# Patient Record
Sex: FEMALE | Race: WHITE | NOT HISPANIC OR LATINO | Employment: OTHER | URBAN - METROPOLITAN AREA
[De-identification: names, ages, dates, MRNs, and addresses within clinical notes are randomized per-mention and may not be internally consistent; named-entity substitution may affect disease eponyms.]

---

## 2018-03-05 ENCOUNTER — RECORDS - HEALTHEAST (OUTPATIENT)
Dept: LAB | Facility: CLINIC | Age: 78
End: 2018-03-05

## 2018-03-05 LAB
ANION GAP SERPL CALCULATED.3IONS-SCNC: 11 MMOL/L (ref 5–18)
BUN SERPL-MCNC: 12 MG/DL (ref 8–28)
CALCIUM SERPL-MCNC: 9.6 MG/DL (ref 8.5–10.5)
CHLORIDE BLD-SCNC: 107 MMOL/L (ref 98–107)
CHOLEST SERPL-MCNC: 268 MG/DL
CO2 SERPL-SCNC: 24 MMOL/L (ref 22–31)
CREAT SERPL-MCNC: 0.69 MG/DL (ref 0.6–1.1)
FASTING STATUS PATIENT QL REPORTED: YES
GFR SERPL CREATININE-BSD FRML MDRD: >60 ML/MIN/1.73M2
GLUCOSE BLD-MCNC: 101 MG/DL (ref 70–125)
HDLC SERPL-MCNC: 44 MG/DL
LDLC SERPL CALC-MCNC: 136 MG/DL
LDLC SERPL CALC-MCNC: ABNORMAL MG/DL
POTASSIUM BLD-SCNC: 4.4 MMOL/L (ref 3.5–5)
SODIUM SERPL-SCNC: 142 MMOL/L (ref 136–145)
TRIGL SERPL-MCNC: 440 MG/DL

## 2019-06-18 ENCOUNTER — RECORDS - HEALTHEAST (OUTPATIENT)
Dept: LAB | Facility: CLINIC | Age: 79
End: 2019-06-18

## 2019-06-18 LAB
ALBUMIN SERPL-MCNC: 4.2 G/DL (ref 3.5–5)
ALP SERPL-CCNC: 66 U/L (ref 45–120)
ALT SERPL W P-5'-P-CCNC: 23 U/L (ref 0–45)
ANION GAP SERPL CALCULATED.3IONS-SCNC: 9 MMOL/L (ref 5–18)
AST SERPL W P-5'-P-CCNC: 23 U/L (ref 0–40)
BILIRUB SERPL-MCNC: 0.6 MG/DL (ref 0–1)
BUN SERPL-MCNC: 13 MG/DL (ref 8–28)
CALCIUM SERPL-MCNC: 10 MG/DL (ref 8.5–10.5)
CHLORIDE BLD-SCNC: 107 MMOL/L (ref 98–107)
CHOLEST SERPL-MCNC: 203 MG/DL
CO2 SERPL-SCNC: 26 MMOL/L (ref 22–31)
CREAT SERPL-MCNC: 0.72 MG/DL (ref 0.6–1.1)
FASTING STATUS PATIENT QL REPORTED: ABNORMAL
GFR SERPL CREATININE-BSD FRML MDRD: >60 ML/MIN/1.73M2
GLUCOSE BLD-MCNC: 98 MG/DL (ref 70–125)
HDLC SERPL-MCNC: 47 MG/DL
LDLC SERPL CALC-MCNC: 94 MG/DL
POTASSIUM BLD-SCNC: 4.6 MMOL/L (ref 3.5–5)
PROT SERPL-MCNC: 6.9 G/DL (ref 6–8)
SODIUM SERPL-SCNC: 142 MMOL/L (ref 136–145)
TRIGL SERPL-MCNC: 309 MG/DL

## 2021-05-29 ENCOUNTER — RECORDS - HEALTHEAST (OUTPATIENT)
Dept: ADMINISTRATIVE | Facility: CLINIC | Age: 81
End: 2021-05-29

## 2021-07-26 ENCOUNTER — HOSPITAL ENCOUNTER (INPATIENT)
Facility: HOSPITAL | Age: 81
LOS: 3 days | Discharge: HOME | DRG: 409 | End: 2021-07-30
Attending: EMERGENCY MEDICINE | Admitting: FAMILY MEDICINE
Payer: COMMERCIAL

## 2021-07-26 DIAGNOSIS — R74.01 TRANSAMINITIS: ICD-10-CM

## 2021-07-26 DIAGNOSIS — B96.20 E COLI BACTEREMIA: Primary | ICD-10-CM

## 2021-07-26 DIAGNOSIS — Z90.49 S/P LAPAROSCOPIC CHOLECYSTECTOMY: ICD-10-CM

## 2021-07-26 DIAGNOSIS — R78.81 E COLI BACTEREMIA: Primary | ICD-10-CM

## 2021-07-26 DIAGNOSIS — K80.50 CHOLEDOCHOLITHIASIS: ICD-10-CM

## 2021-07-26 LAB
ALBUMIN SERPL-MCNC: 3.9 G/DL (ref 3.5–5)
ALP SERPL-CCNC: 163 U/L (ref 45–120)
ALT SERPL W P-5'-P-CCNC: 641 U/L (ref 0–45)
ANION GAP SERPL CALCULATED.3IONS-SCNC: 14 MMOL/L (ref 5–18)
AST SERPL W P-5'-P-CCNC: 979 U/L (ref 0–40)
BILIRUB DIRECT SERPL-MCNC: 1.6 MG/DL
BILIRUB SERPL-MCNC: 3.1 MG/DL (ref 0–1)
BUN SERPL-MCNC: 13 MG/DL (ref 8–28)
CALCIUM SERPL-MCNC: 9.4 MG/DL (ref 8.5–10.5)
CHLORIDE BLD-SCNC: 102 MMOL/L (ref 98–107)
CO2 SERPL-SCNC: 22 MMOL/L (ref 22–31)
CREAT SERPL-MCNC: 0.77 MG/DL (ref 0.6–1.1)
ERYTHROCYTE [DISTWIDTH] IN BLOOD BY AUTOMATED COUNT: 12.1 % (ref 10–15)
GFR SERPL CREATININE-BSD FRML MDRD: 73 ML/MIN/1.73M2
GLUCOSE BLD-MCNC: 132 MG/DL (ref 70–125)
HCT VFR BLD AUTO: 40.5 % (ref 35–47)
HGB BLD-MCNC: 13.7 G/DL (ref 11.7–15.7)
HOLD SPECIMEN: NORMAL
LACTATE SERPL-SCNC: 2.8 MMOL/L (ref 0.7–2)
LIPASE SERPL-CCNC: 65 U/L (ref 0–52)
MCH RBC QN AUTO: 32.7 PG (ref 26.5–33)
MCHC RBC AUTO-ENTMCNC: 33.8 G/DL (ref 31.5–36.5)
MCV RBC AUTO: 97 FL (ref 78–100)
PLATELET # BLD AUTO: 273 10E3/UL (ref 150–450)
POTASSIUM BLD-SCNC: 4.1 MMOL/L (ref 3.5–5)
PROT SERPL-MCNC: 7.4 G/DL (ref 6–8)
RBC # BLD AUTO: 4.19 10E6/UL (ref 3.8–5.2)
SODIUM SERPL-SCNC: 138 MMOL/L (ref 136–145)
WBC # BLD AUTO: 8.9 10E3/UL (ref 4–11)

## 2021-07-26 PROCEDURE — 83605 ASSAY OF LACTIC ACID: CPT | Performed by: EMERGENCY MEDICINE

## 2021-07-26 PROCEDURE — 99285 EMERGENCY DEPT VISIT HI MDM: CPT | Mod: 25

## 2021-07-26 PROCEDURE — 82248 BILIRUBIN DIRECT: CPT | Performed by: EMERGENCY MEDICINE

## 2021-07-26 PROCEDURE — 83690 ASSAY OF LIPASE: CPT | Performed by: EMERGENCY MEDICINE

## 2021-07-26 PROCEDURE — 82310 ASSAY OF CALCIUM: CPT | Performed by: EMERGENCY MEDICINE

## 2021-07-26 PROCEDURE — 250N000011 HC RX IP 250 OP 636: Performed by: EMERGENCY MEDICINE

## 2021-07-26 PROCEDURE — 36415 COLL VENOUS BLD VENIPUNCTURE: CPT | Performed by: STUDENT IN AN ORGANIZED HEALTH CARE EDUCATION/TRAINING PROGRAM

## 2021-07-26 PROCEDURE — 85027 COMPLETE CBC AUTOMATED: CPT | Performed by: EMERGENCY MEDICINE

## 2021-07-26 PROCEDURE — 36415 COLL VENOUS BLD VENIPUNCTURE: CPT | Performed by: EMERGENCY MEDICINE

## 2021-07-26 PROCEDURE — 96375 TX/PRO/DX INJ NEW DRUG ADDON: CPT

## 2021-07-26 PROCEDURE — 80053 COMPREHEN METABOLIC PANEL: CPT | Performed by: EMERGENCY MEDICINE

## 2021-07-26 RX ORDER — ONDANSETRON 2 MG/ML
4 INJECTION INTRAMUSCULAR; INTRAVENOUS ONCE
Status: COMPLETED | OUTPATIENT
Start: 2021-07-26 | End: 2021-07-26

## 2021-07-26 RX ORDER — MORPHINE SULFATE 4 MG/ML
4 INJECTION, SOLUTION INTRAMUSCULAR; INTRAVENOUS ONCE
Status: COMPLETED | OUTPATIENT
Start: 2021-07-26 | End: 2021-07-26

## 2021-07-26 RX ORDER — PIPERACILLIN SODIUM, TAZOBACTAM SODIUM 3; .375 G/15ML; G/15ML
3.38 INJECTION, POWDER, LYOPHILIZED, FOR SOLUTION INTRAVENOUS ONCE
Status: COMPLETED | OUTPATIENT
Start: 2021-07-26 | End: 2021-07-27

## 2021-07-26 RX ADMIN — MORPHINE SULFATE 4 MG: 4 INJECTION INTRAVENOUS at 23:06

## 2021-07-26 RX ADMIN — ONDANSETRON 4 MG: 2 INJECTION INTRAMUSCULAR; INTRAVENOUS at 23:05

## 2021-07-26 ASSESSMENT — MIFFLIN-ST. JEOR: SCORE: 1030.61

## 2021-07-27 ENCOUNTER — ANESTHESIA EVENT (OUTPATIENT)
Dept: SURGERY | Facility: HOSPITAL | Age: 81
DRG: 409 | End: 2021-07-27
Payer: COMMERCIAL

## 2021-07-27 ENCOUNTER — APPOINTMENT (OUTPATIENT)
Dept: ULTRASOUND IMAGING | Facility: HOSPITAL | Age: 81
DRG: 409 | End: 2021-07-27
Attending: EMERGENCY MEDICINE
Payer: COMMERCIAL

## 2021-07-27 ENCOUNTER — ANESTHESIA (OUTPATIENT)
Dept: SURGERY | Facility: HOSPITAL | Age: 81
DRG: 409 | End: 2021-07-27
Payer: COMMERCIAL

## 2021-07-27 ENCOUNTER — APPOINTMENT (OUTPATIENT)
Dept: RADIOLOGY | Facility: HOSPITAL | Age: 81
DRG: 409 | End: 2021-07-27
Attending: INTERNAL MEDICINE
Payer: COMMERCIAL

## 2021-07-27 PROBLEM — R74.01 TRANSAMINITIS: Status: ACTIVE | Noted: 2021-07-27

## 2021-07-27 PROBLEM — K80.50 CHOLEDOCHOLITHIASIS: Status: ACTIVE | Noted: 2021-07-27

## 2021-07-27 LAB
ACINETOBACTER SPECIES: NOT DETECTED
ALBUMIN SERPL-MCNC: 3.2 G/DL (ref 3.5–5)
ALBUMIN UR-MCNC: 10 MG/DL
ALP SERPL-CCNC: 147 U/L (ref 45–120)
ALT SERPL W P-5'-P-CCNC: 575 U/L (ref 0–45)
ANION GAP SERPL CALCULATED.3IONS-SCNC: 10 MMOL/L (ref 5–18)
APPEARANCE UR: ABNORMAL
AST SERPL W P-5'-P-CCNC: 422 U/L (ref 0–40)
BACTERIA #/AREA URNS HPF: ABNORMAL /HPF
BILIRUB SERPL-MCNC: 5.5 MG/DL (ref 0–1)
BILIRUB UR QL STRIP: ABNORMAL
BUN SERPL-MCNC: 12 MG/DL (ref 8–28)
CALCIUM SERPL-MCNC: 8.4 MG/DL (ref 8.5–10.5)
CHLORIDE BLD-SCNC: 107 MMOL/L (ref 98–107)
CITROBACTER SPECIES: NOT DETECTED
CO2 SERPL-SCNC: 23 MMOL/L (ref 22–31)
COLOR UR AUTO: YELLOW
CREAT SERPL-MCNC: 0.75 MG/DL (ref 0.6–1.1)
CTX-M: NOT DETECTED
ENTEROBACTER SPECIES: NOT DETECTED
ERCP: NORMAL
ERYTHROCYTE [DISTWIDTH] IN BLOOD BY AUTOMATED COUNT: 12.2 % (ref 10–15)
ESCHERICHIA COLI: DETECTED
GFR SERPL CREATININE-BSD FRML MDRD: 75 ML/MIN/1.73M2
GLUCOSE BLD-MCNC: 117 MG/DL (ref 70–125)
GLUCOSE UR STRIP-MCNC: NEGATIVE MG/DL
HCT VFR BLD AUTO: 34.4 % (ref 35–47)
HGB BLD-MCNC: 12 G/DL (ref 11.7–15.7)
HGB UR QL STRIP: NEGATIVE
IMP: NOT DETECTED
KETONES UR STRIP-MCNC: NEGATIVE MG/DL
KLEBSIELLA OXYTOCA: NOT DETECTED
KLEBSIELLA PNEUMONIAE: NOT DETECTED
KPC: NOT DETECTED
LACTATE SERPL-SCNC: 1.4 MMOL/L (ref 0.7–2)
LEUKOCYTE ESTERASE UR QL STRIP: ABNORMAL
LIPASE SERPL-CCNC: 29 U/L (ref 0–52)
MCH RBC QN AUTO: 33 PG (ref 26.5–33)
MCHC RBC AUTO-ENTMCNC: 34.9 G/DL (ref 31.5–36.5)
MCV RBC AUTO: 95 FL (ref 78–100)
NDM: NOT DETECTED
NITRATE UR QL: NEGATIVE
OXA (DETECTED/NOT DETECTED): NOT DETECTED
PH UR STRIP: 5.5 [PH] (ref 5–7)
PLATELET # BLD AUTO: 244 10E3/UL (ref 150–450)
POTASSIUM BLD-SCNC: 3.7 MMOL/L (ref 3.5–5)
PROT SERPL-MCNC: 6.2 G/DL (ref 6–8)
PROTEUS SPECIES: NOT DETECTED
PSEUDOMONAS AERUGINOSA: NOT DETECTED
RBC # BLD AUTO: 3.64 10E6/UL (ref 3.8–5.2)
RBC URINE: <1 /HPF
SARS-COV-2 RNA RESP QL NAA+PROBE: NEGATIVE
SODIUM SERPL-SCNC: 140 MMOL/L (ref 136–145)
SP GR UR STRIP: 1.02 (ref 1–1.03)
SQUAMOUS EPITHELIAL: 1 /HPF
UROBILINOGEN UR STRIP-MCNC: 2 MG/DL
VIM: NOT DETECTED
WBC # BLD AUTO: 12.6 10E3/UL (ref 4–11)
WBC URINE: 2 /HPF

## 2021-07-27 PROCEDURE — 36415 COLL VENOUS BLD VENIPUNCTURE: CPT | Performed by: EMERGENCY MEDICINE

## 2021-07-27 PROCEDURE — G0378 HOSPITAL OBSERVATION PER HR: HCPCS

## 2021-07-27 PROCEDURE — C9803 HOPD COVID-19 SPEC COLLECT: HCPCS

## 2021-07-27 PROCEDURE — 250N000011 HC RX IP 250 OP 636: Performed by: EMERGENCY MEDICINE

## 2021-07-27 PROCEDURE — C1726 CATH, BAL DIL, NON-VASCULAR: HCPCS | Performed by: INTERNAL MEDICINE

## 2021-07-27 PROCEDURE — 250N000011 HC RX IP 250 OP 636: Performed by: NURSE ANESTHETIST, CERTIFIED REGISTERED

## 2021-07-27 PROCEDURE — 250N000009 HC RX 250: Performed by: NURSE ANESTHETIST, CERTIFIED REGISTERED

## 2021-07-27 PROCEDURE — 96361 HYDRATE IV INFUSION ADD-ON: CPT

## 2021-07-27 PROCEDURE — 250N000011 HC RX IP 250 OP 636: Performed by: ANESTHESIOLOGY

## 2021-07-27 PROCEDURE — 36415 COLL VENOUS BLD VENIPUNCTURE: CPT | Performed by: HOSPITALIST

## 2021-07-27 PROCEDURE — 87635 SARS-COV-2 COVID-19 AMP PRB: CPT | Performed by: FAMILY MEDICINE

## 2021-07-27 PROCEDURE — 99207 PR CDG-CODE CATEGORY CHANGED: CPT | Performed by: HOSPITALIST

## 2021-07-27 PROCEDURE — 250N000011 HC RX IP 250 OP 636: Performed by: STUDENT IN AN ORGANIZED HEALTH CARE EDUCATION/TRAINING PROGRAM

## 2021-07-27 PROCEDURE — 83690 ASSAY OF LIPASE: CPT | Performed by: FAMILY MEDICINE

## 2021-07-27 PROCEDURE — 258N000003 HC RX IP 258 OP 636: Performed by: STUDENT IN AN ORGANIZED HEALTH CARE EDUCATION/TRAINING PROGRAM

## 2021-07-27 PROCEDURE — 76705 ECHO EXAM OF ABDOMEN: CPT

## 2021-07-27 PROCEDURE — 250N000011 HC RX IP 250 OP 636: Performed by: SURGERY

## 2021-07-27 PROCEDURE — 96376 TX/PRO/DX INJ SAME DRUG ADON: CPT

## 2021-07-27 PROCEDURE — 99232 SBSQ HOSP IP/OBS MODERATE 35: CPT | Performed by: HOSPITALIST

## 2021-07-27 PROCEDURE — 0F944ZZ DRAINAGE OF GALLBLADDER, PERCUTANEOUS ENDOSCOPIC APPROACH: ICD-10-PCS | Performed by: SURGERY

## 2021-07-27 PROCEDURE — 258N000003 HC RX IP 258 OP 636: Performed by: FAMILY MEDICINE

## 2021-07-27 PROCEDURE — 258N000003 HC RX IP 258 OP 636: Performed by: EMERGENCY MEDICINE

## 2021-07-27 PROCEDURE — 360N000083 HC SURGERY LEVEL 3 W/ FLUORO, PER MIN: Performed by: INTERNAL MEDICINE

## 2021-07-27 PROCEDURE — 0FC98ZZ EXTIRPATION OF MATTER FROM COMMON BILE DUCT, VIA NATURAL OR ARTIFICIAL OPENING ENDOSCOPIC: ICD-10-PCS | Performed by: INTERNAL MEDICINE

## 2021-07-27 PROCEDURE — 250N000013 HC RX MED GY IP 250 OP 250 PS 637: Performed by: ANESTHESIOLOGY

## 2021-07-27 PROCEDURE — 370N000017 HC ANESTHESIA TECHNICAL FEE, PER MIN: Performed by: INTERNAL MEDICINE

## 2021-07-27 PROCEDURE — 96365 THER/PROPH/DIAG IV INF INIT: CPT

## 2021-07-27 PROCEDURE — 710N000009 HC RECOVERY PHASE 1, LEVEL 1, PER MIN: Performed by: INTERNAL MEDICINE

## 2021-07-27 PROCEDURE — 250N000025 HC SEVOFLURANE, PER MIN: Performed by: INTERNAL MEDICINE

## 2021-07-27 PROCEDURE — 99222 1ST HOSP IP/OBS MODERATE 55: CPT | Mod: 57 | Performed by: SURGERY

## 2021-07-27 PROCEDURE — 99223 1ST HOSP IP/OBS HIGH 75: CPT | Performed by: FAMILY MEDICINE

## 2021-07-27 PROCEDURE — 81003 URINALYSIS AUTO W/O SCOPE: CPT | Performed by: EMERGENCY MEDICINE

## 2021-07-27 PROCEDURE — 250N000011 HC RX IP 250 OP 636: Performed by: FAMILY MEDICINE

## 2021-07-27 PROCEDURE — C1769 GUIDE WIRE: HCPCS | Performed by: INTERNAL MEDICINE

## 2021-07-27 PROCEDURE — 87149 DNA/RNA DIRECT PROBE: CPT | Performed by: EMERGENCY MEDICINE

## 2021-07-27 PROCEDURE — 87186 SC STD MICRODIL/AGAR DIL: CPT | Performed by: EMERGENCY MEDICINE

## 2021-07-27 PROCEDURE — 258N000003 HC RX IP 258 OP 636: Performed by: ANESTHESIOLOGY

## 2021-07-27 PROCEDURE — 74330 X-RAY BILE/PANC ENDOSCOPY: CPT

## 2021-07-27 PROCEDURE — 272N000001 HC OR GENERAL SUPPLY STERILE: Performed by: INTERNAL MEDICINE

## 2021-07-27 PROCEDURE — 250N000013 HC RX MED GY IP 250 OP 250 PS 637: Performed by: SURGERY

## 2021-07-27 PROCEDURE — 250N000013 HC RX MED GY IP 250 OP 250 PS 637: Performed by: HOSPITALIST

## 2021-07-27 PROCEDURE — 47562 LAPAROSCOPIC CHOLECYSTECTOMY: CPT | Performed by: SURGERY

## 2021-07-27 PROCEDURE — 88304 TISSUE EXAM BY PATHOLOGIST: CPT | Mod: TC | Performed by: SURGERY

## 2021-07-27 PROCEDURE — 999N000141 HC STATISTIC PRE-PROCEDURE NURSING ASSESSMENT: Performed by: INTERNAL MEDICINE

## 2021-07-27 PROCEDURE — 0FB44ZZ EXCISION OF GALLBLADDER, PERCUTANEOUS ENDOSCOPIC APPROACH: ICD-10-PCS | Performed by: SURGERY

## 2021-07-27 PROCEDURE — 87086 URINE CULTURE/COLONY COUNT: CPT | Performed by: FAMILY MEDICINE

## 2021-07-27 PROCEDURE — 83605 ASSAY OF LACTIC ACID: CPT | Performed by: HOSPITALIST

## 2021-07-27 PROCEDURE — 85027 COMPLETE CBC AUTOMATED: CPT | Performed by: FAMILY MEDICINE

## 2021-07-27 PROCEDURE — 80053 COMPREHEN METABOLIC PANEL: CPT | Performed by: FAMILY MEDICINE

## 2021-07-27 PROCEDURE — 258N000001 HC RX 258: Performed by: INTERNAL MEDICINE

## 2021-07-27 PROCEDURE — 120N000001 HC R&B MED SURG/OB

## 2021-07-27 RX ORDER — HALOPERIDOL 5 MG/ML
1 INJECTION INTRAMUSCULAR
Status: DISCONTINUED | OUTPATIENT
Start: 2021-07-27 | End: 2021-07-27 | Stop reason: HOSPADM

## 2021-07-27 RX ORDER — BUPIVACAINE HYDROCHLORIDE 2.5 MG/ML
INJECTION, SOLUTION EPIDURAL; INFILTRATION; INTRACAUDAL PRN
Status: DISCONTINUED | OUTPATIENT
Start: 2021-07-27 | End: 2021-07-27 | Stop reason: HOSPADM

## 2021-07-27 RX ORDER — SODIUM CHLORIDE, SODIUM LACTATE, POTASSIUM CHLORIDE, CALCIUM CHLORIDE 600; 310; 30; 20 MG/100ML; MG/100ML; MG/100ML; MG/100ML
INJECTION, SOLUTION INTRAVENOUS CONTINUOUS
Status: DISCONTINUED | OUTPATIENT
Start: 2021-07-27 | End: 2021-07-28

## 2021-07-27 RX ORDER — DEXAMETHASONE SODIUM PHOSPHATE 4 MG/ML
INJECTION, SOLUTION INTRA-ARTICULAR; INTRALESIONAL; INTRAMUSCULAR; INTRAVENOUS; SOFT TISSUE PRN
Status: DISCONTINUED | OUTPATIENT
Start: 2021-07-27 | End: 2021-07-27

## 2021-07-27 RX ORDER — LIDOCAINE 40 MG/G
CREAM TOPICAL
Status: DISCONTINUED | OUTPATIENT
Start: 2021-07-27 | End: 2021-07-30 | Stop reason: HOSPADM

## 2021-07-27 RX ORDER — NALOXONE HYDROCHLORIDE 0.4 MG/ML
0.2 INJECTION, SOLUTION INTRAMUSCULAR; INTRAVENOUS; SUBCUTANEOUS
Status: DISCONTINUED | OUTPATIENT
Start: 2021-07-27 | End: 2021-07-30 | Stop reason: HOSPADM

## 2021-07-27 RX ORDER — HYDROMORPHONE HCL IN WATER/PF 6 MG/30 ML
0.2 PATIENT CONTROLLED ANALGESIA SYRINGE INTRAVENOUS
Status: DISCONTINUED | OUTPATIENT
Start: 2021-07-27 | End: 2021-07-30 | Stop reason: HOSPADM

## 2021-07-27 RX ORDER — PROCHLORPERAZINE MALEATE 5 MG
5 TABLET ORAL EVERY 6 HOURS PRN
Status: DISCONTINUED | OUTPATIENT
Start: 2021-07-27 | End: 2021-07-30 | Stop reason: HOSPADM

## 2021-07-27 RX ORDER — OXYCODONE HYDROCHLORIDE 5 MG/1
5 TABLET ORAL EVERY 4 HOURS PRN
Status: DISCONTINUED | OUTPATIENT
Start: 2021-07-27 | End: 2021-07-30 | Stop reason: HOSPADM

## 2021-07-27 RX ORDER — SODIUM CHLORIDE, SODIUM LACTATE, POTASSIUM CHLORIDE, AND CALCIUM CHLORIDE .6; .31; .03; .02 G/100ML; G/100ML; G/100ML; G/100ML
IRRIGANT IRRIGATION PRN
Status: DISCONTINUED | OUTPATIENT
Start: 2021-07-27 | End: 2021-07-27 | Stop reason: HOSPADM

## 2021-07-27 RX ORDER — SODIUM CHLORIDE, SODIUM LACTATE, POTASSIUM CHLORIDE, CALCIUM CHLORIDE 600; 310; 30; 20 MG/100ML; MG/100ML; MG/100ML; MG/100ML
INJECTION, SOLUTION INTRAVENOUS CONTINUOUS
Status: DISCONTINUED | OUTPATIENT
Start: 2021-07-27 | End: 2021-07-27 | Stop reason: HOSPADM

## 2021-07-27 RX ORDER — PROCHLORPERAZINE 25 MG
12.5 SUPPOSITORY, RECTAL RECTAL EVERY 12 HOURS PRN
Status: DISCONTINUED | OUTPATIENT
Start: 2021-07-27 | End: 2021-07-30 | Stop reason: HOSPADM

## 2021-07-27 RX ORDER — SODIUM CHLORIDE 9 MG/ML
INJECTION, SOLUTION INTRAVENOUS CONTINUOUS
Status: DISCONTINUED | OUTPATIENT
Start: 2021-07-27 | End: 2021-07-27

## 2021-07-27 RX ORDER — MORPHINE SULFATE 4 MG/ML
4 INJECTION, SOLUTION INTRAMUSCULAR; INTRAVENOUS EVERY 4 HOURS PRN
Status: DISCONTINUED | OUTPATIENT
Start: 2021-07-27 | End: 2021-07-30 | Stop reason: HOSPADM

## 2021-07-27 RX ORDER — NALOXONE HYDROCHLORIDE 0.4 MG/ML
0.4 INJECTION, SOLUTION INTRAMUSCULAR; INTRAVENOUS; SUBCUTANEOUS
Status: DISCONTINUED | OUTPATIENT
Start: 2021-07-27 | End: 2021-07-30 | Stop reason: HOSPADM

## 2021-07-27 RX ORDER — ACETAMINOPHEN 325 MG/1
975 TABLET ORAL ONCE
Status: COMPLETED | OUTPATIENT
Start: 2021-07-27 | End: 2021-07-27

## 2021-07-27 RX ORDER — VIT C/B6/B5/MAGNESIUM/HERB 173 50-5-6-5MG
1 CAPSULE ORAL DAILY
COMMUNITY

## 2021-07-27 RX ORDER — PIPERACILLIN SODIUM, TAZOBACTAM SODIUM 3; .375 G/15ML; G/15ML
3.38 INJECTION, POWDER, LYOPHILIZED, FOR SOLUTION INTRAVENOUS EVERY 8 HOURS
Status: DISCONTINUED | OUTPATIENT
Start: 2021-07-27 | End: 2021-07-30 | Stop reason: HOSPADM

## 2021-07-27 RX ORDER — FAMOTIDINE 20 MG
1 TABLET ORAL DAILY
COMMUNITY

## 2021-07-27 RX ORDER — ONDANSETRON 4 MG/1
4 TABLET, ORALLY DISINTEGRATING ORAL EVERY 30 MIN PRN
Status: DISCONTINUED | OUTPATIENT
Start: 2021-07-27 | End: 2021-07-27 | Stop reason: HOSPADM

## 2021-07-27 RX ORDER — DIPHENHYDRAMINE HCL 12.5 MG/5ML
12.5 SOLUTION ORAL EVERY 6 HOURS PRN
Status: DISCONTINUED | OUTPATIENT
Start: 2021-07-27 | End: 2021-07-27 | Stop reason: HOSPADM

## 2021-07-27 RX ORDER — LABETALOL HYDROCHLORIDE 5 MG/ML
10 INJECTION, SOLUTION INTRAVENOUS EVERY 5 MIN PRN
Status: DISCONTINUED | OUTPATIENT
Start: 2021-07-27 | End: 2021-07-27 | Stop reason: HOSPADM

## 2021-07-27 RX ORDER — PROPOFOL 10 MG/ML
INJECTION, EMULSION INTRAVENOUS PRN
Status: DISCONTINUED | OUTPATIENT
Start: 2021-07-27 | End: 2021-07-27

## 2021-07-27 RX ORDER — IBUPROFEN 200 MG
1 CAPSULE ORAL DAILY
COMMUNITY

## 2021-07-27 RX ORDER — MORPHINE SULFATE 4 MG/ML
4 INJECTION, SOLUTION INTRAMUSCULAR; INTRAVENOUS ONCE
Status: DISCONTINUED | OUTPATIENT
Start: 2021-07-27 | End: 2021-07-27

## 2021-07-27 RX ORDER — DIPHENHYDRAMINE HYDROCHLORIDE 50 MG/ML
12.5 INJECTION INTRAMUSCULAR; INTRAVENOUS EVERY 6 HOURS PRN
Status: DISCONTINUED | OUTPATIENT
Start: 2021-07-27 | End: 2021-07-27 | Stop reason: HOSPADM

## 2021-07-27 RX ORDER — ONDANSETRON 2 MG/ML
4 INJECTION INTRAMUSCULAR; INTRAVENOUS EVERY 30 MIN PRN
Status: DISCONTINUED | OUTPATIENT
Start: 2021-07-27 | End: 2021-07-27 | Stop reason: HOSPADM

## 2021-07-27 RX ORDER — ONDANSETRON 2 MG/ML
INJECTION INTRAMUSCULAR; INTRAVENOUS PRN
Status: DISCONTINUED | OUTPATIENT
Start: 2021-07-27 | End: 2021-07-27

## 2021-07-27 RX ORDER — LIDOCAINE HYDROCHLORIDE 20 MG/ML
INJECTION, SOLUTION INFILTRATION; PERINEURAL PRN
Status: DISCONTINUED | OUTPATIENT
Start: 2021-07-27 | End: 2021-07-27

## 2021-07-27 RX ORDER — HYDRALAZINE HYDROCHLORIDE 20 MG/ML
5 INJECTION INTRAMUSCULAR; INTRAVENOUS EVERY 6 HOURS PRN
Status: DISCONTINUED | OUTPATIENT
Start: 2021-07-27 | End: 2021-07-30 | Stop reason: HOSPADM

## 2021-07-27 RX ORDER — HYDROMORPHONE HCL IN WATER/PF 6 MG/30 ML
0.4 PATIENT CONTROLLED ANALGESIA SYRINGE INTRAVENOUS EVERY 5 MIN PRN
Status: DISCONTINUED | OUTPATIENT
Start: 2021-07-27 | End: 2021-07-27 | Stop reason: HOSPADM

## 2021-07-27 RX ORDER — FENTANYL CITRATE 50 UG/ML
INJECTION, SOLUTION INTRAMUSCULAR; INTRAVENOUS PRN
Status: DISCONTINUED | OUTPATIENT
Start: 2021-07-27 | End: 2021-07-27

## 2021-07-27 RX ORDER — TRAMADOL HYDROCHLORIDE 50 MG/1
50 TABLET ORAL EVERY 6 HOURS PRN
Status: DISCONTINUED | OUTPATIENT
Start: 2021-07-27 | End: 2021-07-30 | Stop reason: HOSPADM

## 2021-07-27 RX ORDER — ATORVASTATIN CALCIUM 40 MG/1
40 TABLET, FILM COATED ORAL AT BEDTIME
Status: DISCONTINUED | OUTPATIENT
Start: 2021-07-27 | End: 2021-07-30 | Stop reason: HOSPADM

## 2021-07-27 RX ORDER — KETAMINE HYDROCHLORIDE 10 MG/ML
INJECTION INTRAMUSCULAR; INTRAVENOUS PRN
Status: DISCONTINUED | OUTPATIENT
Start: 2021-07-27 | End: 2021-07-27

## 2021-07-27 RX ORDER — FENTANYL CITRATE 50 UG/ML
50 INJECTION, SOLUTION INTRAMUSCULAR; INTRAVENOUS EVERY 5 MIN PRN
Status: DISCONTINUED | OUTPATIENT
Start: 2021-07-27 | End: 2021-07-27 | Stop reason: HOSPADM

## 2021-07-27 RX ORDER — MEPERIDINE HYDROCHLORIDE 25 MG/ML
12.5 INJECTION INTRAMUSCULAR; INTRAVENOUS; SUBCUTANEOUS EVERY 5 MIN PRN
Status: DISCONTINUED | OUTPATIENT
Start: 2021-07-27 | End: 2021-07-27 | Stop reason: HOSPADM

## 2021-07-27 RX ADMIN — TRAMADOL HYDROCHLORIDE 50 MG: 50 TABLET, FILM COATED ORAL at 20:54

## 2021-07-27 RX ADMIN — LIDOCAINE HYDROCHLORIDE 60 MG: 20 INJECTION, SOLUTION INFILTRATION; PERINEURAL at 12:47

## 2021-07-27 RX ADMIN — PHENYLEPHRINE HYDROCHLORIDE 100 MCG: 10 INJECTION INTRAVENOUS at 14:19

## 2021-07-27 RX ADMIN — ONDANSETRON 4 MG: 2 INJECTION INTRAMUSCULAR; INTRAVENOUS at 12:47

## 2021-07-27 RX ADMIN — SODIUM CHLORIDE: 9 INJECTION, SOLUTION INTRAVENOUS at 07:05

## 2021-07-27 RX ADMIN — PROPOFOL 100 MG: 10 INJECTION, EMULSION INTRAVENOUS at 12:47

## 2021-07-27 RX ADMIN — OXYCODONE HYDROCHLORIDE 5 MG: 5 TABLET ORAL at 23:42

## 2021-07-27 RX ADMIN — SODIUM CHLORIDE 1000 ML: 9 INJECTION, SOLUTION INTRAVENOUS at 01:24

## 2021-07-27 RX ADMIN — DEXAMETHASONE SODIUM PHOSPHATE 10 MG: 4 INJECTION, SOLUTION INTRA-ARTICULAR; INTRALESIONAL; INTRAMUSCULAR; INTRAVENOUS; SOFT TISSUE at 12:47

## 2021-07-27 RX ADMIN — SODIUM CHLORIDE, POTASSIUM CHLORIDE, SODIUM LACTATE AND CALCIUM CHLORIDE: 600; 310; 30; 20 INJECTION, SOLUTION INTRAVENOUS at 11:47

## 2021-07-27 RX ADMIN — MORPHINE SULFATE 4 MG: 4 INJECTION INTRAVENOUS at 07:54

## 2021-07-27 RX ADMIN — TRAZODONE HYDROCHLORIDE 25 MG: 50 TABLET ORAL at 20:54

## 2021-07-27 RX ADMIN — FENTANYL CITRATE 50 MCG: 50 INJECTION, SOLUTION INTRAMUSCULAR; INTRAVENOUS at 15:29

## 2021-07-27 RX ADMIN — KETAMINE HYDROCHLORIDE 25 MG: 10 INJECTION, SOLUTION INTRAMUSCULAR; INTRAVENOUS at 13:39

## 2021-07-27 RX ADMIN — PIPERACILLIN SODIUM AND TAZOBACTAM SODIUM 3.38 G: 3; .375 INJECTION, POWDER, LYOPHILIZED, FOR SOLUTION INTRAVENOUS at 08:00

## 2021-07-27 RX ADMIN — FENTANYL CITRATE 50 MCG: 50 INJECTION, SOLUTION INTRAMUSCULAR; INTRAVENOUS at 15:19

## 2021-07-27 RX ADMIN — PIPERACILLIN SODIUM AND TAZOBACTAM SODIUM 3.38 G: 3; .375 INJECTION, POWDER, LYOPHILIZED, FOR SOLUTION INTRAVENOUS at 23:42

## 2021-07-27 RX ADMIN — FENTANYL CITRATE 50 MCG: 50 INJECTION, SOLUTION INTRAMUSCULAR; INTRAVENOUS at 12:47

## 2021-07-27 RX ADMIN — PHENYLEPHRINE HYDROCHLORIDE 150 MCG: 10 INJECTION INTRAVENOUS at 13:16

## 2021-07-27 RX ADMIN — SUGAMMADEX 150 MG: 100 INJECTION, SOLUTION INTRAVENOUS at 14:58

## 2021-07-27 RX ADMIN — PIPERACILLIN SODIUM AND TAZOBACTAM SODIUM 3.38 G: 3; .375 INJECTION, POWDER, LYOPHILIZED, FOR SOLUTION INTRAVENOUS at 01:40

## 2021-07-27 RX ADMIN — ROCURONIUM BROMIDE 10 MG: 10 INJECTION, SOLUTION INTRAVENOUS at 14:16

## 2021-07-27 RX ADMIN — PROPOFOL 50 MG: 10 INJECTION, EMULSION INTRAVENOUS at 12:55

## 2021-07-27 RX ADMIN — HYDROMORPHONE HYDROCHLORIDE 0.5 MG: 1 INJECTION, SOLUTION INTRAMUSCULAR; INTRAVENOUS; SUBCUTANEOUS at 14:33

## 2021-07-27 RX ADMIN — ROCURONIUM BROMIDE 50 MG: 10 INJECTION, SOLUTION INTRAVENOUS at 12:47

## 2021-07-27 RX ADMIN — ROCURONIUM BROMIDE 10 MG: 10 INJECTION, SOLUTION INTRAVENOUS at 14:11

## 2021-07-27 RX ADMIN — FENTANYL CITRATE 50 MCG: 50 INJECTION, SOLUTION INTRAMUSCULAR; INTRAVENOUS at 13:03

## 2021-07-27 RX ADMIN — KETAMINE HYDROCHLORIDE 25 MG: 10 INJECTION, SOLUTION INTRAMUSCULAR; INTRAVENOUS at 12:47

## 2021-07-27 RX ADMIN — HYDROMORPHONE HYDROCHLORIDE 0.5 MG: 1 INJECTION, SOLUTION INTRAMUSCULAR; INTRAVENOUS; SUBCUTANEOUS at 14:30

## 2021-07-27 RX ADMIN — ACETAMINOPHEN 975 MG: 325 TABLET ORAL at 11:47

## 2021-07-27 RX ADMIN — LABETALOL HYDROCHLORIDE 10 MG: 5 INJECTION, SOLUTION INTRAVENOUS at 15:39

## 2021-07-27 ASSESSMENT — ENCOUNTER SYMPTOMS
SEIZURES: 0
PALPITATIONS: 0
DYSRHYTHMIAS: 0
NAUSEA: 1
DIARRHEA: 0
COLOR CHANGE: 0
FEVER: 0
EYE PAIN: 0
COUGH: 0
ARTHRALGIAS: 0
DYSURIA: 0
BACK PAIN: 0
HEMATURIA: 0
VOMITING: 1
DIZZINESS: 0
CHILLS: 0
SHORTNESS OF BREATH: 0
BLOOD IN STOOL: 0
SORE THROAT: 0
CONSTIPATION: 0
ABDOMINAL PAIN: 1
SEIZURES: 0

## 2021-07-27 ASSESSMENT — MIFFLIN-ST. JEOR
SCORE: 1089.57
SCORE: 1117.07

## 2021-07-27 ASSESSMENT — COPD QUESTIONNAIRES: COPD: 0

## 2021-07-27 ASSESSMENT — LIFESTYLE VARIABLES: TOBACCO_USE: 0

## 2021-07-27 ASSESSMENT — ACTIVITIES OF DAILY LIVING (ADL): DEPENDENT_IADLS:: INDEPENDENT

## 2021-07-27 NOTE — CONSULTS
"                                     CONSULTING PHYSICIAN   Romelia Gore,      REASON FOR CONSULTATION   Elevated LFTs     CHIEF COMPLAINT   Celina Galicia came to the hospital for evaluation of RUQ abd pain     HISTORY OF PRESENT ILLNESS   Celina Galicia is a pleasant 81 year old female HTN, hyperlipidemia admitted with one day of abd pain.    Patient notes onset of RUQ pain yesterday. She had a similar presentation in Sept 2020. No episodes in the interim. Feels nausea but no emesis. No change in bowels. No fever    In Sept, noted to have pericholecystic fluid on CT. U/S and CT without stones but did show polyps. Normal LFTs, CBD measured at 5 mm. Treated with ABX with resolution of symptoms    Today, U/S shows GB wall at 5 mm and CBD at 7mm.   LFTS with T bili 3.1,      One glass of bourbon cocktail daily      PAST HISTORY   Past Medical History:   Diagnosis Date     Benign essential hypertension      Carotid artery stenosis      HLD (hyperlipidemia)       Past Surgical History:   Procedure Laterality Date     APPENDECTOMY       HYSTERECTOMY, JOSEPH          Family History Social History   History reviewed. No pertinent family history.   Occupation:     Marital Status:     Tobacco: None    Alcohol: None    Recreational Drugs: None     MEDICATIONS & ALLERGIES   (Not in a hospital admission)       ALLERGIES   No Known Allergies      REVIEW OF SYSTEMS     no chest pain    no SOB    no fevers/sweats/chills    no weight gain or loss    no nausea or vomiting    no abdominal pain    no constipation or diarrhea    no black or bloody stools    no numbness or weakness    no jaundice or dark urine  A comprehensive review of systems was performed and was otherwise noncontributory.     OBJECTIVE   Vitals Blood pressure (!) 167/77, pulse 78, temperature 98.8  F (37.1  C), resp. rate 18, height 1.575 m (5' 2\"), weight 61.2 kg (135 lb), SpO2 97 %.           Physical  Exam   GENERAL: alert and oriented, well " nourished in no apparent distress    SKIN: warm and dry, no rashes    HEENT: atraumatic, anicteric, moist mucous membranes, neck soft/supple     PULMONARY: normal resp effort, breath sounds clear to auscultation bilateral    CARDIOVASCULAR: normal rate and rhythm, no murmurs, no edema    ABDOMEN: no tenderness, no distention, bowel sounds normal    MUSCULOSKELETAL: joints and gait normal    NEUROLOGICAL: appropriate mental status, grossly intact    PSYCHIATRIC: normal mood, affect and insight        LABORATORY    ELECTROLYTE PANEL   Recent Labs   Lab 07/26/21 2048      POTASSIUM 4.1   CHLORIDE 102   CO2 22   *   CR 0.77   BUN 13      HEMATOLOGY PANEL   Recent Labs   Lab 07/26/21 2048   HGB 13.7   MCV 97   WBC 8.9         LIVER AND PANCREAS PANEL   Recent Labs   Lab 07/26/21 2048   *   *   ALKPHOS 163*   BILITOTAL 3.1*   LIPASE 65*     IMAGING STUDIES        I have reviewed the current diagnostic and laboratory tests.           IMPRESSION   Celina Galicia is a pleasant 81 year old female with RUQ pain and imaging concerning for cholecystitis and elevated LFTs    Elevated LFTs- concerning for choledocholithiasis given degree of elevation. Infection/abscess, Mirizzi syndrome also on differential. Unlikely to represent viral or medication induced liver injury. LFTs too high for ETOH as cause    Will plan ERCP given degree of elevation with high pre test concern for obstruction.   Will consult surgery for CCY             Jae Mazariegos MD  Thank you for the opportunity to participate in the care of this patient.   Please feel free to call me with any questions or concerns.  Phone number [phone].

## 2021-07-27 NOTE — ED NOTES
Pt states pain has improved after morphine.Pain is right upper abdomen. Abdomen is soft with active bowel sounds throughout.  Voiding without difficulty.  Aching. Pt is alert and oriented.   Lungs clear bilat.  No periph edema noted.    Pt updated on status of bed.  Pt kept npo.

## 2021-07-27 NOTE — H&P
ADMISSION HISTORY & PHYSICAL        ADMIT DATE: 7/26/2021      FACILITY: [facility]      PCP: No Ref-Primary, Physician, None     ASSESSMENT AND PLAN:  Patient is a 81 year old female with history significant for  has a past medical history of Benign essential hypertension, Carotid artery stenosis, and HLD (hyperlipidemia). comes in for right-sided abdominal pain with N/V and admitted for further evaluation and management of this.     Active Problems:    Choledocholithiasis    Transaminitis    Abdominal pain and nausea/Elevated LFTs   -patient did not fit SIRS criteria on admission  -US abdomen shows borderline dilated common bile duct and shows wall thickening. Liver shows fatty infiltration.  -LFTs are elevated  -consult GI for possible ERCP  -NPO for now  -c/w IV morphine for pain control  -IV fluids  -c/w IV zosyn started in ED  -IV compazine for nausea      Acute pancreatitis  -lipase elevated at 65  -IV fluids   -NPO for now until GI sees patient  -monitor lipase daily      UTI?  -UA was suspicious for UTI  -c/w IV zosyn started in ED  -order urine culture    GERD  -IV protonix    HLD  -c/w home lipitor    HTN  -BP elevated on admission  -hold home lisinopril for now  -IV hydralazine PRN    Hx of diverticulitis  -patient has LLQ tenderness on palpation but patient has had no changes in her BMs recently  -will defer obtaining a CT abdomen to GI        FEN/GI: NPO for now until GI sees patient, IV fluids  DVT proph: hold lovenox for now  Code status: Full code        Disposition:  -Anticipated Length of Stay in midnights and medical necessity (including a midnight in the Emergency Department after triage if applicable): 2-3 days  -Discharge barriers: symptom management, GI consult, ERCP?       CHIEF COMPLAINT:  Chief Complaint   Patient presents with     Abdominal Pain        HISTORY OF PRESENTING ILLNESS:  Patient is a 81 year old female with history significant for  has a past medical  history of Benign essential hypertension, Carotid artery stenosis, and HLD (hyperlipidemia). comes in for right-sided abdominal pain with N/V and admitted for further evaluation and management of this.       Patient comes in for sharp right-sided abdominal pain that has been occurring since noon on 7/26. She reports she started experiencing this abdominal pain before she ate or drank anything today. She complains of nausea. She had an episode of non-bloody emesis before coming to the hospital.   Patient had ED visit at [facility] on 9/26/2020 for similar abdominal pain. At that time, the pain was dull and wrapped around her back. Work-up during that ED visit was unremarkable and patient was given morphine and discharged.     Patient has hx of diverticulitis and is not sure if pain is from that. Patient denies any other complaints at this time.       PMH:  Past Medical History:   Diagnosis Date     Benign essential hypertension      Carotid artery stenosis      HLD (hyperlipidemia)        PSH:  Past Surgical History:   Procedure Laterality Date     APPENDECTOMY       HYSTERECTOMY, JOSEPH          ALLERGIES:  No Known Allergies    MEDICATIONS:  Reviewed.  Current Facility-Administered Medications   Medication     morphine (PF) injection 4 mg     Current Outpatient Medications   Medication     atorvastatin (LIPITOR) 40 MG tablet     Lactobacillus rhamnosus GG (CULTURELLE) 15 billion cell CpSP     lisinopriL (PRINIVIL,ZESTRIL) 40 MG tablet     omeprazole (PRILOSEC) 20 MG capsule     oxyCODONE (ROXICODONE) 5 MG immediate release tablet        SOCIAL HISTORY:  Social History     Socioeconomic History     Marital status:      Spouse name: Not on file     Number of children: Not on file     Years of education: Not on file     Highest education level: Not on file   Occupational History     Not on file   Tobacco Use     Smoking status: Not on file   Substance and Sexual Activity     Alcohol use: Not on file     Drug use:  Not on file     Sexual activity: Not on file   Other Topics Concern     Not on file   Social History Narrative     Not on file     Social Determinants of Health     Financial Resource Strain:      Difficulty of Paying Living Expenses:    Food Insecurity:      Worried About Running Out of Food in the Last Year:      Ran Out of Food in the Last Year:    Transportation Needs:      Lack of Transportation (Medical):      Lack of Transportation (Non-Medical):    Physical Activity:      Days of Exercise per Week:      Minutes of Exercise per Session:    Stress:      Feeling of Stress :    Social Connections:      Frequency of Communication with Friends and Family:      Frequency of Social Gatherings with Friends and Family:      Attends Yazdanism Services:      Active Member of Clubs or Organizations:      Attends Club or Organization Meetings:      Marital Status:    Intimate Partner Violence:      Fear of Current or Ex-Partner:      Emotionally Abused:      Physically Abused:      Sexually Abused:        FAMILY HISTORY:  History reviewed. No pertinent family history.    ROS:  Review of Systems   Constitutional: Negative for chills and fever.   HENT: Negative for congestion, ear pain and sore throat.    Eyes: Negative for pain and visual disturbance.   Respiratory: Negative for cough and shortness of breath.    Cardiovascular: Negative for chest pain and palpitations.   Gastrointestinal: Positive for abdominal pain, nausea and vomiting. Negative for blood in stool, constipation and diarrhea.   Genitourinary: Negative for dysuria and hematuria.   Musculoskeletal: Negative for arthralgias and back pain.   Skin: Negative for color change and rash.   Neurological: Negative for dizziness, seizures and syncope.   All other systems reviewed and are negative.         PHYSICAL EXAM:  Patient Vitals for the past 24 hrs:   BP Temp Temp src Pulse Resp SpO2 Height Weight   07/27/21 0300 124/58 -- -- 84 -- 95 % -- --   07/27/21 0245 --  "-- -- 86 -- 96 % -- --   07/27/21 0230 (!) 143/81 -- -- 87 -- 96 % -- --   07/27/21 0215 (!) 151/70 -- -- 91 -- 95 % -- --   07/27/21 0200 (!) 145/67 -- -- 91 -- 95 % -- --   07/26/21 2318 (!) 177/81 -- -- 89 18 95 % -- --   07/26/21 2039 (!) 190/99 99.4  F (37.4  C) Oral 103 22 98 % 1.575 m (5' 2\") 61.2 kg (135 lb)      No intake or output data in the 24 hours ending 07/27/21 0401  GENRL: Alert and oriented X 3. Not in acute distress. Satting at 95% on room air.   HEENT: NC/AT      Neck- supple      Sclera- anicteric      Mucous membrane- moist and pink  CHEST: Clear to auscultation bilaterally  HEART: S1S2 regular. No murmurs, rubs or gallops  ABDMN: Soft. Moderately tender on palpation of RUQ. Mildly tender on palpation of LLQ. No guarding or rigidity. Bowel sounds- active  EXTRM:  No pedal edema.   NEURO: No involuntary movements  INTGM: please see nursing assessment for full skin assessment  PULSES: 2+ and symmetric all extremities  PSYCH: normal affect, normal speech       DIAGNOSTIC DATA:  Recent Results (from the past 24 hour(s))   Extra Red Top Tube    Collection Time: 07/26/21  8:48 PM   Result Value Ref Range    Hold Specimen [facility]    Extra Green Top (Lithium Heparin) Tube    Collection Time: 07/26/21  8:48 PM   Result Value Ref Range    Hold Specimen JIC    Extra Purple Top Tube    Collection Time: 07/26/21  8:48 PM   Result Value Ref Range    Hold Specimen JIC    CBC (+ platelets, no diff)    Collection Time: 07/26/21  8:48 PM   Result Value Ref Range    WBC Count 8.9 4.0 - 11.0 10e3/uL    RBC Count 4.19 3.80 - 5.20 10e6/uL    Hemoglobin 13.7 11.7 - 15.7 g/dL    Hematocrit 40.5 35.0 - 47.0 %    MCV 97 78 - 100 fL    MCH 32.7 26.5 - 33.0 pg    MCHC 33.8 31.5 - 36.5 g/dL    RDW 12.1 10.0 - 15.0 %    Platelet Count 273 150 - 450 10e3/uL   Basic metabolic panel    Collection Time: 07/26/21  8:48 PM   Result Value Ref Range    Sodium 138 136 - 145 mmol/L    Potassium 4.1 3.5 - 5.0 mmol/L    Chloride 102 98 - " 107 mmol/L    Carbon Dioxide (CO2) 22 22 - 31 mmol/L    Anion Gap 14 5 - 18 mmol/L    Urea Nitrogen 13 8 - 28 mg/dL    Creatinine 0.77 0.60 - 1.10 mg/dL    Calcium 9.4 8.5 - 10.5 mg/dL    Glucose 132 (H) 70 - 125 mg/dL    GFR Estimate 73 >60 mL/min/1.73m2   Hepatic function panel    Collection Time: 07/26/21  8:48 PM   Result Value Ref Range    Bilirubin Total 3.1 (H) 0.0 - 1.0 mg/dL    Bilirubin Direct 1.6 (H) <=0.5 mg/dL    Protein Total 7.4 6.0 - 8.0 g/dL    Albumin 3.9 3.5 - 5.0 g/dL    Alkaline Phosphatase 163 (H) 45 - 120 U/L     (H) 0 - 40 U/L     (H) 0 - 45 U/L   Lipase    Collection Time: 07/26/21  8:48 PM   Result Value Ref Range    Lipase 65 (H) 0 - 52 U/L   Lactic acid whole blood    Collection Time: 07/26/21 11:07 PM   Result Value Ref Range    Lactic Acid 2.8 (H) 0.7 - 2.0 mmol/L   UA with Microscopic reflex to Culture    Collection Time: 07/27/21 12:54 AM    Specimen: Urine, Clean Catch   Result Value Ref Range    Color Urine Yellow Colorless, Straw, Light Yellow, Yellow    Appearance Urine Turbid (A) Clear    Glucose Urine Negative Negative mg/dL    Bilirubin Urine 0.5 mg/dL (A) Negative    Ketones Urine Negative Negative mg/dL    Specific Gravity Urine 1.018 1.001 - 1.030    Blood Urine Negative Negative    pH Urine 5.5 5.0 - 7.0    Protein Albumin Urine 10  (A) Negative mg/dL    Urobilinogen Urine 2.0 (A) <2.0 mg/dL    Nitrite Urine Negative Negative    Leukocyte Esterase Urine 75 Lonny/uL (A) Negative    Bacteria Urine Few (A) None Seen /HPF    RBC Urine <1 <=2 /HPF    WBC Urine 2 <=5 /HPF    Squamous Epithelials Urine 1 <=1 /HPF      Results for orders placed or performed during the hospital encounter of 07/26/21   Abdomen US, limited (RUQ only)    Impression    IMPRESSION:  1.  The gallbladder is contracted and suboptimally assessed. Recommend repeat study after fasting. Probable polyp.  2.  Echogenic liver compatible with fatty infiltration.  3.  Borderline dilated common bile  duct.          All lab studies reviewed personally    Radiology Results: imaging impression reviewed      Total time is 70 minutes with greater than 50% of time spent in chart review, coordination of care with ED provider/patient's PCP/provider from outside facility and consultants, and discussing plan of care with patient and his/her family.     Hallie Georges MD.   [facility] Medicine Service   [phone]   Pager [phone]    head/neck/posterior

## 2021-07-27 NOTE — CONSULTS
General Surgery Consultation  Celina Galicia MRN# [id number]   Age/Sex: 81 year old female YOB: 1940     Reason for consult: 1. Transaminitis    2. Choledocholithiasis            Requesting physician: Dr Mazariegos                   Assessment and Plan:   Assessment:  Choledocholithiasis  Cholecystitis  Gallbladder polyps    Plan:  Discussed pathophysiology, risk and benefit, potential complications with patient and her daughter.  Patient would like to have cholecystectomy at the same time as the ERCP.  Planned laparoscopic cholecystectomy today.  Thank you for consulting us in involving us in her care.    I have seen and examined the patient.  I have reviewed and agree with the note written by the NP/PA team member.   Longstanding history of intermittent right upper quadrant pain.  Presented to the emergency room with 24 hours of right upper quadrant pain.  Noted to have elevated LFTs with choledocholithiasis.  GI has been consulted and plan for ERCP today.  Abdomen-nondistended  Ultrasound-images reviewed  Labs-reviewed  Assessment/plan-choledocholithiasis  After discussion with the patient and her daughter the plan will be for laparoscopic cholecystectomy to follow the ERCP.  Risk benefits procedure were explained patient is consented to proceed.  Ramirez Cortes D.O., FACS  [phone]  [facility] Department of Surgery          Chief Complaint:     Chief Complaint   Patient presents with     Abdominal Pain        History is obtained from the patient    HPI:   Celina Galicia is a 81 year old female who presents with acute abdominal pain that started yesterday prior to eating lunch.  Pain was severe sharp and right upper quadrant.  No radiation of pain.  Had some associated nausea without vomiting.  No change in bowel habits or fevers.  Had similar symptoms 6 months ago in September and was treated for mild cholecystitis with oral antibiotics.  Had been doing well prior to this.  In the past she has had  surgeries for appendectomy open and total hysterectomy due to endometriosis and hip replacement.  She endorses some shortness of breath with walking.  She states that she cannot walk and talk at the same time.  Not clear of how long this been going on.  She believes is been on for several years.  Denies any chest pain with activity.         Past Medical History:     Past Medical History:   Diagnosis Date     Benign essential hypertension      Carotid artery stenosis      HLD (hyperlipidemia)               Past Surgical History:     Past Surgical History:   Procedure Laterality Date     APPENDECTOMY       HYSTERECTOMY, JOSEPH               Social History:       1 cocktail per day       Family History:   History reviewed. No pertinent family history.           Allergies:   No Known Allergies           Medications:     Prior to Admission medications    Medication Sig Start Date End Date Taking? Authorizing Provider   Ascorbic Acid (VITAMIN C) 500 MG CHEW Take 1 tablet by mouth daily   Yes Unknown, Entered By History   aspirin (ASA) 81 MG EC tablet Take 81 mg by mouth daily    Yes Unknown, Entered By History   atorvastatin (LIPITOR) 40 MG tablet [ATORVASTATIN (LIPITOR) 40 MG TABLET] Take 40 mg by mouth at bedtime. 7/24/20  Yes Provider, Historical   calcium carbonate 500 mg, elemental, (OSCAL 500) 1250 (500 Ca) MG TABS tablet Take 1 tablet by mouth daily   Yes Unknown, Entered By History   lisinopriL (PRINIVIL,ZESTRIL) 40 MG tablet [LISINOPRIL (PRINIVIL,ZESTRIL) 40 MG TABLET] Take 40 mg by mouth daily. 7/24/20  Yes Provider, Historical   Turmeric 500 MG CAPS Take 1 capsule by mouth daily   Yes Unknown, Entered By History   Vitamin D, Cholecalciferol, 25 MCG (1000 UT) CAPS Take 1 capsule by mouth daily   Yes Unknown, Entered By History              Review of Systems:   The Review of Systems is negative other than noted in the HPI            Physical Exam:     Patient Vitals for the past 24 hrs:   BP Temp Temp src Pulse Resp  "SpO2 Height Weight   07/27/21 0808 (!) 157/69 98.7  F (37.1  C) Oral 80 16 97 % -- --   07/27/21 0700 (!) 167/77 98.8  F (37.1  C) -- 78 18 98 % -- --   07/27/21 0430 -- -- -- 81 -- 97 % -- --   07/27/21 0300 124/58 -- -- 84 -- 95 % -- --   07/27/21 0245 -- -- -- 86 -- 96 % -- --   07/27/21 0230 (!) 143/81 -- -- 87 -- 96 % -- --   07/27/21 0215 (!) 151/70 -- -- 91 -- 95 % -- --   07/27/21 0200 (!) 145/67 -- -- 91 -- 95 % -- --   07/26/21 2318 (!) 177/81 -- -- 89 18 95 % -- --   07/26/21 2039 (!) 190/99 99.4  F (37.4  C) Oral 103 22 98 % 1.575 m (5' 2\") 61.2 kg (135 lb)        No intake or output data in the 24 hours ending 07/27/21 1115   Constitutional:   awake, alert, cooperative, no apparent distress, and appears stated age       Eyes:   PERRL, conjunctiva/corneas clear, EOM's intact; no scleral edema or icterus noted        ENT:   Normocephalic, without obvious abnormality, atraumatic, Lips, mucosa, and tongue normal        Hematologic / Lymphatic:   wnl       Lungs:   Normal respiratory effort, no accessory muscle use       Cardiovascular:   Regular rate and rhythm       Abdomen:   Soft tenderness right upper quadrant without rebound or peritoneal signs present.       Musculoskeletal:   No obvious swelling, bruising or deformity       Skin:   Skin color and texture normal for patient, no rashes or lesions              Data:        Admission on 07/26/2021   Component Date Value     Hold Specimen 07/26/2021 [facility]      Hold Specimen 07/26/2021 [facility]      Hold Specimen 07/26/2021 [facility]      WBC Count 07/26/2021 8.9      RBC Count 07/26/2021 4.19      Hemoglobin 07/26/2021 13.7      Hematocrit 07/26/2021 40.5      MCV 07/26/2021 97      MCH 07/26/2021 32.7      MCHC 07/26/2021 33.8      RDW 07/26/2021 12.1      Platelet Count 07/26/2021 273      Sodium 07/26/2021 138      Potassium 07/26/2021 4.1      Chloride 07/26/2021 102      Carbon Dioxide (CO2) 07/26/2021 22      Anion Gap 07/26/2021 14      Urea Nitrogen 07/26/2021 " 13      Creatinine 07/26/2021 0.77      Calcium 07/26/2021 9.4      Glucose 07/26/2021 132*     GFR Estimate 07/26/2021 73      Bilirubin Total 07/26/2021 3.1*     Bilirubin Direct 07/26/2021 1.6*     Protein Total 07/26/2021 7.4      Albumin 07/26/2021 3.9      Alkaline Phosphatase 07/26/2021 163*     AST 07/26/2021 979*     ALT 07/26/2021 641*     Lipase 07/26/2021 65*     Color Urine 07/27/2021 Yellow      Appearance Urine 07/27/2021 Turbid*     Glucose Urine 07/27/2021 Negative      Bilirubin Urine 07/27/2021 0.5 mg/dL*     Ketones Urine 07/27/2021 Negative      Specific Gravity Urine 07/27/2021 1.018      Blood Urine 07/27/2021 Negative      pH Urine 07/27/2021 5.5      Protein Albumin Urine 07/27/2021 10 *     Urobilinogen Urine 07/27/2021 2.0*     Nitrite Urine 07/27/2021 Negative      Leukocyte Esterase Urine 07/27/2021 75 Lonny/uL*     Bacteria Urine 07/27/2021 Few*     RBC Urine 07/27/2021 <1      WBC Urine 07/27/2021 2      Squamous Epithelials Uri* 07/27/2021 1      Lactic Acid 07/26/2021 2.8*     Sodium 07/27/2021 140      Potassium 07/27/2021 3.7      Chloride 07/27/2021 107      Carbon Dioxide (CO2) 07/27/2021 23      Anion Gap 07/27/2021 10      Urea Nitrogen 07/27/2021 12      Creatinine 07/27/2021 0.75      Calcium 07/27/2021 8.4*     Glucose 07/27/2021 117      Alkaline Phosphatase 07/27/2021 147*     AST 07/27/2021 422*     ALT 07/27/2021 575*     Protein Total 07/27/2021 6.2      Albumin 07/27/2021 3.2*     Bilirubin Total 07/27/2021 5.5*     GFR Estimate 07/27/2021 75      WBC Count 07/27/2021 12.6*     RBC Count 07/27/2021 3.64*     Hemoglobin 07/27/2021 12.0      Hematocrit 07/27/2021 34.4*     MCV 07/27/2021 95      MCH 07/27/2021 33.0      MCHC 07/27/2021 34.9      RDW 07/27/2021 12.2      Platelet Count 07/27/2021 244      Lipase 07/27/2021 29      SARS CoV2 PCR 07/27/2021 Negative      Lactic Acid 07/27/2021 1.4       Reviewed CT and ultrasound imaging and agree with distended gallbladder  with questionable thickness on the CT.    Benedict Garber PA-C

## 2021-07-27 NOTE — PROGRESS NOTES
Hospitalist Progress Note    Assessment/Plan    Active Problems:    Choledocholithiasis    Transaminitis  81-year-old patient with history of carotid stenosis, hyperlipidemia, hypertension presented to the hospital with right-sided abdominal pain nausea and vomiting elevated LFTs    Abdominal pain due to choledocholithiasis, cholecystitis,  Ultrasound abdomen showed dilated common bile duct and wall thickening no evidence of this time but polyp  LFT elevated,  Patient kept n.p.o. with IV fluid and IV Zosyn started in the ER,  GI consulted for possible ERCP, which was done,  Surgery consulted and patient underwent laparoscopic cholecystectomy  Started on clear liquid diet, postop pain control with tramadol and Dilaudid,        Elevated LFT    alk phos 163 and lipase of 65  Concerning for choledocholithiasis,  GI consulted, ERCP ordered successful removal of common bile duct stone, will repeat LFT tomorrow    Hyperlipidemia  On statin held right now due to elevated LFT    Hypertension  Blood pressure is elevated, likely secondary to pain, IV hydralazine right now holding lisinopril,    Suspected UTI  Urine looked infected she denies any symptoms of UTI,    CODE STATUS  Full code  Barriers to Discharge: Postoperative recovery    Anticipated discharge date/Disposition: Home in 1 to 2 days    Subjective  Patient was seen postop, she states that she is very tired because she had a long diet, requesting medication to help her sleep, daughter at bedside and asking about the plan of care, encouraged her to have p.o. intake as tolerated      Objective    Vital signs in last 24 hours  Temp:  [97.9  F (36.6  C)-99.4  F (37.4  C)] 98.1  F (36.7  C)  Pulse:  [] 66  Resp:  [13-22] 18  BP: (124-219)/() 145/65  FiO2 (%):  [1 %] 1 %  SpO2:  [94 %-100 %] 96 % [unfilled] O2 Device: Nasal cannula    Weight:   [unfilled] Weight change:     Intake/Output last 3 shifts  I/O last 3 completed shifts:  In: 800  [I.V.:800]  Out: 5 [Blood:5]  Body mass index is 28.18 kg/m .3    Physical Exam:  General Appearance: Alert, oriented x3, does not appear in distress.  HEENT: Normocephalic. No scleral icterus, . Mucous membranes moist.  Heart: :Regular rate and rhythm, normal S1 ,S2, No murmurs, no JVD, no pedal edema   Lungs: Clear to auscultation bilaterally. No wheezing or crackles  Abdomen: Soft, laparoscopic incisions with LELIA drain in place, diffuse tender, no rebound or rigidity, non distended, bowel sounds present.  Extremity: No deformity. No joint swelling.    Pertinent Labs   Lab Results: personally reviewed.   Recent Labs   Lab 07/27/21  0939 07/26/21 2048    138   CO2 23 22   BUN 12 13   ALBUMIN 3.2* 3.9   ALKPHOS 147* 163*   * 641*   * 979*     Recent Labs   Lab 07/27/21  0939 07/26/21 2048   WBC 12.6* 8.9   HGB 12.0 13.7   HCT 34.4* 40.5    273     No results for input(s): CKTOTAL, TROPONINI in the last 168 hours.    Invalid input(s): TROPONINT, CKMBINDEX  Invalid input(s): POCGLUFGR    Medications  Drug and lactation database from the [place] [employer] Medicine:  http://toxnet.nlm.nih.gov/cgi-bin/sis/htmlgen?LACT      Pertinent Radiology   Radiology Results: Reviewed ultrasound results  EKG Results: personally reviewed    Advanced Care Planning:  Discharge Planning discussed with patient  Total time with this patient is 25 min with 50% of time spent in examining the patient, reviewing records, discussing plan of care and counseling, 50% of time spent in coordination of care.  Care discussed and coordinated with ERNESTO.      Travis Mir MD  Internal Medicine Hospitalist  7/27/2021

## 2021-07-27 NOTE — OP NOTE
Name:  Celina Galicia  PCP:  No Ref-Primary, Physician  Procedure Date:  7/26/2021 - 7/27/2021      Procedure(s):  ENDOSCOPIC RETROGRADE CHOLANGIOPANCREATOGRAPHY, BILIARY SPHINCTEROTOMY, STONE REMOVAL  LAPAROSCOPIC SUB TOTAL CHOLECYSTECTOMY    Pre-Procedure Diagnosis:  Choledocholithiasis [K80.50]     Post-Procedure Diagnosis:    Contracted gallbladder    Surgeon(s):  Skyler Power MD Borut, Jeffrey James, DO    Circulator: Aditya Doe RN; Lor Ulloa RN; Lucrecia Rodriguez RN  Relief Circulator: Kelsey Babcock RN  Relief Scrub: Sonja Garnett  Scrub Person: Sumaya Zee  X-Ray Technologist: Jaden Owens ARRT Endo RN: Jarrod Galindo RN; Fabiola Moore RN    Anesthesia Type:  GET      Findings:  Severely contracted gallbladder to the size of a pea    Operative Report:    The patient was taken to the operating room and placed in a supine position.  SCDs were placed on bilateral lower extremities.  Antibiotics were given prior to skin incision.  The abdomen was prepped and draped in a sterile fashion.    I began the first portion of the procedure by injecting quarter percent Marcaine with epinephrine into the supraumbilical position.  I then made a 5 mm transverse incision above the umbilicus and established a pneumoperitoneum using a Veress needle technique.  Once the pneumoperitoneum was established,I placed a 5 mm trocar with a 5 mm 30  camera into the abdomen.  The surrounding structures were scrutinized for any injuries upon entry.  I did not find any. I placed an 11 mm trocar in the subxiphoid position as well as 2 right upper quadrant 5 mm trochars under direct visualization.  All trochars were placed after local anesthetic was injected to the fascial layers.      I  First took down adhesions surrounding what appeared to be o a small pea-sized gallbladder.  The duodenum and omentum was stuck to this and carefully dissected off.  Eventually I was able to dissect out the  gallbladder which was extremely contracted.  This is contracted into the hilum of the hepatic fossa that I could see.  I was able to unroofed the portion of the gallbladder and express several cc of purulent bile.  This was removed with suction catheter.  Because of the severe contraction of the gallbladder there is no feasible way to take the gallbladder remnant out without injuring the structures in the hepatic fossa.  Having said that I placed 3 clips across the open portion of the gallbladder and a fenestrated manner and removed a portion of the gallbladder roof and sent this off the field as specimen.  Because of the purulent nature of the bile noted I elected to place a 15 Byron drain through the right upper quadrant 5 mm trocar site.  This was placed in the gallbladder fossa and sutured to the skin with a drain stitch.  Once this was complete I removed all trochars and desufflated the abdomen.  I then injected local anesthetic and all fascial layers and reapproximated the skin edges of all 4 trocar sites with 4-0 Monocryl subcuticular sutures.  The wounds were then cleaned and covered with dry sterile dressings.  All sponge counts and needle counts were correct at the end of the procedure.  Estimated Blood Loss:   [unfilled]    Specimens:    ID Type Source Tests Collected by Time Destination   1 : Roof of gallbladder Tissue Gallbladder SURGICAL PATHOLOGY EXAM Maurice Cortes DO 7/27/2021  2:16 PM           Drains:   Closed/Suction Drain 1 Right RUQ Bulb 15 Upper sorbian (Active)       NG/OG/NJ Tube (Active)       Complications:    None    Maurice Cortes DO

## 2021-07-27 NOTE — PHARMACY-ADMISSION MEDICATION HISTORY
Pharmacy Note - Admission Medication History    Pertinent Provider Information: None     ______________________________________________________________________    Prior To Admission (PTA) med list completed and updated in EMR.       PTA Med List   Medication Sig Last Dose     Ascorbic Acid (VITAMIN C) 500 MG CHEW Take 1 tablet by mouth daily 7/26/2021 at Unknown time     aspirin (ASA) 81 MG EC tablet Take 81 mg by mouth daily  7/26/2021 at Unknown time     atorvastatin (LIPITOR) 40 MG tablet [ATORVASTATIN (LIPITOR) 40 MG TABLET] Take 40 mg by mouth at bedtime. 7/26/2021 at Unknown time     calcium carbonate 500 mg, elemental, (OSCAL 500) 1250 (500 Ca) MG TABS tablet Take 1 tablet by mouth daily 7/26/2021 at Unknown time     lisinopriL (PRINIVIL,ZESTRIL) 40 MG tablet [LISINOPRIL (PRINIVIL,ZESTRIL) 40 MG TABLET] Take 40 mg by mouth daily. 7/26/2021 at Unknown time     Turmeric 500 MG CAPS Take 1 capsule by mouth daily 7/26/2021 at Unknown time     Vitamin D, Cholecalciferol, 25 MCG (1000 UT) CAPS Take 1 capsule by mouth daily 7/26/2021 at Unknown time       Information source(s): Patient and CareEverywhere/[facility]  Method of interview communication: in-person    Summary of Changes to PTA Med List  New: aspirin, turmeric, calcium, vit D, vit C  Discontinued: culturelle, omeprazole, oxycodone  Changed: none    Patient was asked about OTC/herbal products specifically.  PTA med list reflects this.    In the past week, patient estimated taking medication this percent of the time:  greater than 90%.    Allergies were reviewed, assessed, and updated with the patient.      Patient does not use any multi-dose medications prior to admission.    The information provided in this note is only as accurate as the sources available at the time of the update(s).    Thank you for the opportunity to participate in the care of this patient.    Nitish Perez RPH  7/27/2021 9:29 AM

## 2021-07-27 NOTE — ANESTHESIA PREPROCEDURE EVALUATION
Anesthesia Pre-Procedure Evaluation    Patient: Celina Galicia   MRN: [id number] : 1940        Preoperative Diagnosis: Choledocholithiasis [K80.50]   Procedure : Procedure(s):  ENDOSCOPIC RETROGRADE CHOLANGIOPANCREATOGRAPHY  LAPAROSCOPIC CHOLECYSTECTOMY     Past Medical History:   Diagnosis Date     Benign essential hypertension      Carotid artery stenosis      HLD (hyperlipidemia)       Past Surgical History:   Procedure Laterality Date     APPENDECTOMY       HYSTERECTOMY, JOSEPH        No Known Allergies   Social History     Tobacco Use     Smoking status: Former Smoker     Smokeless tobacco: Never Used   Substance Use Topics     Alcohol use: Not on file      Wt Readings from Last 1 Encounters:   21 67.1 kg (148 lb)        Anesthesia Evaluation   Pt has had prior anesthetic.     No history of anesthetic complications       ROS/MED HX  ENT/Pulmonary:    (-) tobacco use, asthma, COPD, sleep apnea, CELIA risk factors and recent URI   Neurologic: Comment: Carotid artery stenosis   (-) no seizures and no CVA   Cardiovascular:     (+) hypertension----- (-) taking anticoagulants/antiplatelets, syncope and arrhythmias   METS/Exercise Tolerance: >4 METS    Hematologic:    (-) anemia   Musculoskeletal:       GI/Hepatic: Comment: Choledocholithiasis, transaminitis    (+) GERD,     Renal/Genitourinary:    (-) renal disease   Endo:    (-) Type I DM, Type II DM, thyroid disease and obesity   Psychiatric/Substance Use:    (-) chronic opioid use history   Infectious Disease:    (-) Recent Fever   Malignancy:       Other:            Physical Exam    Airway        Mallampati: II   TM distance: > 3 FB   Neck ROM: limited   Mouth opening: > 3 cm    Respiratory Devices and Support         Dental     Comment: Fair dentition, no loose or removable teeth        Cardiovascular          Rhythm and rate: regular     Pulmonary           breath sounds clear to auscultation           OUTSIDE LABS:  CBC:   Lab Results   Component Value  Date    WBC 12.6 (H) 07/27/2021    WBC 8.9 07/26/2021    HGB 12.0 07/27/2021    HGB 13.7 07/26/2021    HCT 34.4 (L) 07/27/2021    HCT 40.5 07/26/2021     07/27/2021     07/26/2021     BMP:   Lab Results   Component Value Date     07/27/2021     07/26/2021    POTASSIUM 3.7 07/27/2021    POTASSIUM 4.1 07/26/2021    CHLORIDE 107 07/27/2021    CHLORIDE 102 07/26/2021    CO2 23 07/27/2021    CO2 22 07/26/2021    BUN 12 07/27/2021    BUN 13 07/26/2021    CR 0.75 07/27/2021    CR 0.77 07/26/2021     07/27/2021     (H) 07/26/2021     COAGS: No results found for: PTT, INR, FIBR  POC: No results found for: BGM, HCG, HCGS  HEPATIC:   Lab Results   Component Value Date    ALBUMIN 3.2 (L) 07/27/2021    PROTTOTAL 6.2 07/27/2021     (H) 07/27/2021     (H) 07/27/2021    ALKPHOS 147 (H) 07/27/2021    BILITOTAL 5.5 (H) 07/27/2021     OTHER:   Lab Results   Component Value Date    LACT 1.4 07/27/2021    KAI 8.4 (L) 07/27/2021    LIPASE 29 07/27/2021       Anesthesia Plan    ASA Status:  2      Anesthesia Type: General.     - Airway: ETT         Techniques and Equipment:     - Airway: Video-Laryngoscope         Consents    Anesthesia Plan(s) and associated risks, benefits, and realistic alternatives discussed. Questions answered and patient/representative(s) expressed understanding.     - Discussed with:  Patient         Postoperative Care    Pain management: IV analgesics, Multi-modal analgesia.   PONV prophylaxis: Ondansetron (or other 5HT-3), Dexamethasone or Solumedrol     Comments:    GETA - Glidescope  Ketamine low dose on induction  Phenylphrine gtt in line   Avoid benzodiazepines, anticholinergics, diphenhydramine and meperidine 2/2 to age and risk for post-op delirium            Shanae R Runnels, MD

## 2021-07-27 NOTE — PLAN OF CARE
Problem: Adult Inpatient Plan of Care  Goal: Plan of Care Review  Outcome: Improving   Patient admitted to the unit from OR at 1545. Alert and oriented. Vitals stable. IVF LR running at 75 ml/hr. Clear liquid diet tolerated well.   Problem: Risk for Delirium  Goal: Optimal Coping  Outcome: Improving     Problem: Pain Acute  Goal: Acceptable Pain Control and Functional Ability  Outcome: Improving  Patient c/o surgical pain PRN tramadol was given.  Problem: Adult Inpatient Plan of Care  Goal: Absence of Hospital-Acquired Illness or Injury  Intervention: Identify and Manage Fall Risk  Recent Flowsheet Documentation  Taken 7/27/2021 1648 by Marck Knight RN  Safety Promotion/Fall Prevention: bed alarm on  Intervention: Prevent and Manage VTE (Venous Thromboembolism) Risk  Recent Flowsheet Documentation  Taken 7/27/2021 1648 by Marck Knight RN  VTE Prevention/Management: pneumatic compression device.  Up and ambulated on the hallway tolerated well.  Goal: Optimal Comfort and Wellbeing  Intervention: Provide Person-Centered Care  Recent Flowsheet Documentation  Taken 7/27/2021 1648 by Marck Knight RN  Trust Relationship/Rapport: care explained

## 2021-07-27 NOTE — CONSULTS
Care Management Initial Consult    General Information  Assessment completed with: Patient, Children,  (patient and daughter)  Type of CM/SW Visit: Initial Assessment    Primary Care Provider verified and updated as needed: Yes   Readmission within the last 30 days: no previous admission in last 30 days      Reason for Consult: care coordination/care conference  Advance Care Planning:            Communication Assessment  Patient's communication style: spoken language (English or Bilingual)             Cognitive  Cognitive/Neuro/Behavioral: WDL                      Living Environment:   People in home: alone     Current living Arrangements: house      Able to return to prior arrangements: yes       Family/Social Support:  Care provided by: self  Provides care for: no one  Marital Status:   Children          Description of Support System:           Current Resources:   Patient receiving home care services: No     Community Resources: None  Equipment currently used at home: none  Supplies currently used at home: None    Employment/Financial:  Employment Status: retired        Financial Concerns:             Lifestyle & Psychosocial Needs:  Social Determinants of Health     Tobacco Use:      Smoking Tobacco Use:      Smokeless Tobacco Use:    Alcohol Use:      Frequency of Alcohol Consumption:      Average Number of Drinks:      Frequency of Binge Drinking:    Financial Resource Strain:      Difficulty of Paying Living Expenses:    Food Insecurity:      Worried About Running Out of Food in the Last Year:      Ran Out of Food in the Last Year:    Transportation Needs:      Lack of Transportation (Medical):      Lack of Transportation (Non-Medical):    Physical Activity:      Days of Exercise per Week:      Minutes of Exercise per Session:    Stress:      Feeling of Stress :    Social Connections:      Frequency of Communication with Friends and Family:      Frequency of Social Gatherings with Friends and Family:       Attends Restoration Services:      Active Member of Clubs or Organizations:      Attends Club or Organization Meetings:      Marital Status:    Intimate Partner Violence:      Fear of Current or Ex-Partner:      Emotionally Abused:      Physically Abused:      Sexually Abused:    Depression:      PHQ-2 Score:    Housing Stability:      Unable to Pay for Housing in the Last Year:      Number of Places Lived in the Last Year:      Unstable Housing in the Last Year:        Functional Status:  Prior to admission patient needed assistance:   Dependent ADLs:: Independent  Dependent IADLs:: Independent       Mental Health Status:          Chemical Dependency Status:                Values/Beliefs:  Spiritual, Cultural Beliefs, Restoration Practices, Values that affect care:                 Additional Information:  AIDET completed. Writer met with Pt and Pts daughter Hyacinth: [phone]. Pt lives alone in one level town home. She is independent with all ADL's, has no services and no DME used. Pt still drives.    CALI and Observation status given and explained, pt and family verbalized understanding. Paper work given.     Anticipate pt will DC back home without needs depending on progression of care. Family will transport upon DC. Informed that CM will follow progression of care.   Shruthi Packer RN, CM, KENNY

## 2021-07-27 NOTE — OR NURSING
Pt came through ED.  Cl Kendall has all of patient belongings.  Cl Kendall has been called with room number.  Pt transferred to 115.

## 2021-07-27 NOTE — ED PROVIDER NOTES
EMERGENCY DEPARTMENT ENCOUNTER      FINAL IMPRESSION    1. Transaminitis    2. Choledocholithiasis        MEDICAL DECISION MAKING    81-year-old female with a prior history of cholecystitis by report starting antibiotics but never had cholecystectomy presented to the ED with recurrent upper abdominal pain and borderline fever 99.4 and hypertensive 190 on arrival and mild tachycardia at 103.  Examination noted with patient appearing quite uncomfortable and with tenderness to the epigastric and right upper quadrant concerning for Hepatolite pancreatic etiology possible gallstone pancreatitis choledocholithiasis versus cholecystitis.  Patient does not appear jaundiced do not suggest ascending colitis.  Distal concerns for an occult obstructive etiology the low suspicion for diverticular appendiceal process at present.     IV established lab work is sent is noted with evidence of biliary obstruction with a bilirubin of 3.1, alkaline phosphatase 163, , .  Lactic acid elevated at 2.8.  Blood cultures are sent and pending.  Leukocytosis is noted.     Patient given IV fluids analgesics and antiemetics with good results.     She sent for ultrasound imaging showing contracted gallbladder and dilated common bile duct thus raising concern for choledocholithiasis resulting in obstructive pathology.  Patient empirically covered with Zosyn will be hospitalized for likely ERCP.     There are no beds available and she will require transfer versus boarding.     Pt was updated with all results and discussed plan and amenable as above. Pt discussed with hospitalist.    MEDICATIONS GIVEN IN THE EMERGENCY:  Medications   0.9% sodium chloride BOLUS (has no administration in time range)   piperacillin-tazobactam (ZOSYN) 3.375 g vial to attach to  mL bag (has no administration in time range)   morphine (PF) injection 4 mg (has no administration in time range)   morphine (PF) injection 4 mg (4 mg Intravenous Given 7/26/21  2306)   ondansetron (ZOFRAN) injection 4 mg (4 mg Intravenous Given 7/26/21 2305)       NEW PRESCRIPTIONS STARTED AT TODAY'S ER VISIT     Review of your medicines      UNREVIEWED medicines. Ask your doctor about these medicines      Dose / Directions   atorvastatin 40 MG tablet  Commonly known as: LIPITOR      Dose: 40 mg  [ATORVASTATIN (LIPITOR) 40 MG TABLET] Take 40 mg by mouth at bedtime.  Refills: 0     Lactobacillus Rhamnosus (GG) capsule      Dose: 1 capsule  [LACTOBACILLUS RHAMNOSUS GG (CULTURELLE) 15 BILLION CELL CPSP] Take 1 capsule by mouth daily with breakfast.  Refills: 0     lisinopril 40 MG tablet  Commonly known as: ZESTRIL      Dose: 40 mg  [LISINOPRIL (PRINIVIL,ZESTRIL) 40 MG TABLET] Take 40 mg by mouth daily.  Refills: 0     omeprazole 20 MG DR capsule  Commonly known as: priLOSEC  Used for: Pain of upper abdomen      Dose: 20 mg  [OMEPRAZOLE (PRILOSEC) 20 MG CAPSULE] Take 1 capsule (20 mg total) by mouth daily before breakfast.  Quantity: 30 capsule  Refills: 0     oxyCODONE 5 MG tablet  Commonly known as: ROXICODONE  Used for: Pain of upper abdomen      Dose: 5 mg  [OXYCODONE (ROXICODONE) 5 MG IMMEDIATE RELEASE TABLET] Take 1 tablet (5 mg total) by mouth every 8 (eight) hours as needed for pain.  Quantity: 8 tablet  Refills: 0                CHIEF COMPLAINT    Chief Complaint   Patient presents with     Abdominal Pain         HPI    Celina Galicia is a 81 year old female with pertinent past medical history for asymptomatic carotid artery stenosis hypertension, and hyperlipidemia who presents to this Emergency Department for evaluation of abdominal pain.    Per chart review, patient was seen at [facility] Emergency Department on 09/26/2020 for evaluation of abdominal pain. Patient reported abdominal pain since 4:00 AM that she described as a dull aching sensation that wraps to her back. Screened EKG was without acute ischemic changes. Troponin was negative. Lipase was normal. LFTs were normal.  "Ultrasound showed no biliary etiology, small gallbladder polyyps likely benign. Patient was given 8 mg of morphine and discharged.     Patient endorses sharp, right abdominal pain since 12:00 PM today (07/26). She reports she started experiencing the abdominal pain before she ate or drank anything. Patient states in September she experienced similar pain. Patient went to the ED and was diagnosed with a low grade infection in her gallbladder. Denies any history of cholecystectomy. Patient also report she has diverticulitis and the pain could be from that. Endorses nausea. Patient denies fever, constipation, diarrhea, urinary symptoms, or any other complaints at this time.     This document serves as a record of services performed by Dr. Alireza Mayer. It was created on his behalf by Sakshi Barone, trained medical scribe. The creation of this record is based on the scribes personal observations and the providers statements to him/her. This document has been checked and approved by the attending provider.    RELEVANT HISTORY, MEDICATIONS, & ALLERGIES   Past medical history, surgical history, family history, medications, and allergies reviewed and pertinent noted in HPI. See end of note for comprehensive list.    REVIEW OF SYSTEMS    Constitutional:  No fever or chills, no weakness  Respiratory: No shortness of breath, no wheeze, no cough  Cardiovascular:  No chest pain, no palpitations, no syncope.  GI:  No vomiting, no diarrhea. Positive for abdominal pain (right) and nausea.   : No dysuria, No hematuria, No frequency.  Musculoskeletal:  No new muscle/joint pain, no swelling, no loss of function.   Neurologic:  No headache, no focal weakness , no sensory changes    All other systems reviewed and are negative.    PHYSICAL EXAM    VITALS SIGNS: BP (!) 177/81   Pulse 89   Temp 99.4  F (37.4  C) (Oral)   Resp 18   Ht 1.575 m (5' 2\")   Wt 61.2 kg (135 lb)   LMP  (LMP Unknown)   SpO2 95%   BMI 24.69 kg/m  "   Constitutional:  Awake, Alert, Cooperative. Appears uncomfortable.  HENT: Normocephalic, Atraumatic, Bilateral external ears normal, Oropharynx moist, Nose normal.   Neck: Normal range of motion, No tenderness, Supple, No meningismus, No stridor.   Eyes: PERRL, EOMI, Conjunctiva normal, No discharge.   Respiratory: Normal breath sounds, No respiratory distress, No wheezing, No chest tenderness.   Cardiovascular: Normal heart rate, Normal rhythm, No murmurs, No rubs, No gallops.   GI: Soft, No guarding, No CVA tenderness. Tenderness to right upper quadrant and epigastrium.   Musculoskeletal: Neurovascularly intact distally, No edema, No tenderness  Integument: Warm, Dry, No erythema, No rash.   Neurologic: Alert & oriented x 3, Normal motor function, Normal sensory function, No focal deficits noted.     LABS  Labs Ordered and Resulted from Time of ED Arrival Up to the Time of Departure from the ED   BASIC METABOLIC PANEL - Abnormal; Notable for the following components:       Result Value    Glucose 132 (*)     All other components within normal limits   HEPATIC FUNCTION PANEL - Abnormal; Notable for the following components:    Bilirubin Total 3.1 (*)     Bilirubin Direct 1.6 (*)     Alkaline Phosphatase 163 (*)      (*)      (*)     All other components within normal limits   LIPASE - Abnormal; Notable for the following components:    Lipase 65 (*)     All other components within normal limits   ROUTINE UA WITH MICROSCOPIC REFLEX TO CULTURE - Abnormal; Notable for the following components:    Appearance Urine Turbid (*)     Bilirubin Urine 0.5 mg/dL (*)     Protein Albumin Urine 10  (*)     Urobilinogen Urine 2.0 (*)     Leukocyte Esterase Urine 75 Lonny/uL (*)     Bacteria Urine Few (*)     All other components within normal limits    Narrative:     Urine Culture not indicated   LACTIC ACID WHOLE BLOOD - Abnormal; Notable for the following components:    Lactic Acid 2.8 (*)     All other components  within normal limits   CBC WITH PLATELETS - Normal   EXTRA RED TOP TUBE   EXTRA GREEN TOP (LITHIUM HEPARIN) TUBE   EXTRA PURPLE TOP TUBE   BLOOD CULTURE   URINE CULTURE   EXTRA TUBE    Narrative:     The following orders were created for panel order [place] Draw.                  Procedure                               Abnormality         Status                                     ---------                               -----------         ------                                     Extra Red Top Tube[[id number]]                               Final result                               Extra Green Top (Lithium...[[id number]]                      Final result                               Extra Purple Top Tube[[id number]]                            Final result                                                 Please view results for these tests on the individual orders.         EKG    None    RADIOLOGY    Please see official radiology report.  Abdomen US, limited (RUQ only)   Final Result   IMPRESSION:   1.  The gallbladder is contracted and suboptimally assessed. Recommend repeat study after fasting. Probable polyp.   2.  Echogenic liver compatible with fatty infiltration.   3.  Borderline dilated common bile duct.                  All laboratories, imaging and EKG's were personally reviewed and interpreted by myself prior to disposition decision.     PROCEDURES    None    Comprehensive outline of EPIC chart Hx  PAST MEDICAL HISTORY    History reviewed. No pertinent past medical history.  History reviewed. No pertinent surgical history.    CURRENT MEDICATIONS      Current Facility-Administered Medications:      0.9% sodium chloride BOLUS, 1,000 mL, Intravenous, Once, Alireza Mayer MD     morphine (PF) injection 4 mg, 4 mg, Intravenous, Once, Alireza Mayer MD     piperacillin-tazobactam (ZOSYN) 3.375 g vial to attach to  mL bag, 3.375 g, Intravenous, Once, Alireza Mayer MD    Current Outpatient  Medications:      atorvastatin (LIPITOR) 40 MG tablet, [ATORVASTATIN (LIPITOR) 40 MG TABLET] Take 40 mg by mouth at bedtime., Disp: , Rfl:      Lactobacillus rhamnosus GG (CULTURELLE) 15 billion cell CpSP, [LACTOBACILLUS RHAMNOSUS GG (CULTURELLE) 15 BILLION CELL CPSP] Take 1 capsule by mouth daily with breakfast., Disp: , Rfl:      lisinopriL (PRINIVIL,ZESTRIL) 40 MG tablet, [LISINOPRIL (PRINIVIL,ZESTRIL) 40 MG TABLET] Take 40 mg by mouth daily., Disp: , Rfl:      omeprazole (PRILOSEC) 20 MG capsule, [OMEPRAZOLE (PRILOSEC) 20 MG CAPSULE] Take 1 capsule (20 mg total) by mouth daily before breakfast., Disp: 30 capsule, Rfl: 0     oxyCODONE (ROXICODONE) 5 MG immediate release tablet, [OXYCODONE (ROXICODONE) 5 MG IMMEDIATE RELEASE TABLET] Take 1 tablet (5 mg total) by mouth every 8 (eight) hours as needed for pain., Disp: 8 tablet, Rfl: 0    ALLERGIES    No Known Allergies    FAMILY HISTORY    History reviewed. No pertinent family history.    SOCIAL HISTORY    Social History     Socioeconomic History     Marital status:      Spouse name: Not on file     Number of children: Not on file     Years of education: Not on file     Highest education level: Not on file   Occupational History     Not on file   Tobacco Use     Smoking status: Not on file   Substance and Sexual Activity     Alcohol use: Not on file     Drug use: Not on file     Sexual activity: Not on file   Other Topics Concern     Not on file   Social History Narrative     Not on file     Social Determinants of Health     Financial Resource Strain:      Difficulty of Paying Living Expenses:    Food Insecurity:      Worried About Running Out of Food in the Last Year:      Ran Out of Food in the Last Year:    Transportation Needs:      Lack of Transportation (Medical):      Lack of Transportation (Non-Medical):    Physical Activity:      Days of Exercise per Week:      Minutes of Exercise per Session:    Stress:      Feeling of Stress :    Social  Connections:      Frequency of Communication with Friends and Family:      Frequency of Social Gatherings with Friends and Family:      Attends [employer] Services:      Active Member of Clubs or Organizations:      Attends Club or Organization Meetings:      Marital Status:    Intimate Partner Violence:      Fear of Current or Ex-Partner:      Emotionally Abused:      Physically Abused:      Sexually Abused:        This document serves as a record of services performed by Dr. Alireza Mayer. It was created on his behalf by Sakshi Barone, trained medical scribe. The creation of this record is based on the scribes personal observations and the providers statements to him/her.     I Dr. Alireza Mayer, personally performed the services described in this documentation as scribed by the above named individual in my presence, and it is both accurate and complete.      Alireza Mayer MD  07/27/21 0059       Alireza Mayer MD  07/27/21 0124

## 2021-07-27 NOTE — ANESTHESIA CARE TRANSFER NOTE
Patient: Celina Galicia    Procedure(s):  ENDOSCOPIC RETROGRADE CHOLANGIOPANCREATOGRAPHY, BILIARY SPHINCTEROTOMY, STONE REMOVAL  LAPAROSCOPIC SUB TOTAL CHOLECYSTECTOMY    Diagnosis: Choledocholithiasis [K80.50]  Diagnosis Additional Information: No value filed.    Anesthesia Type:   General     Note:    Oropharynx: oropharynx clear of all foreign objects  Level of Consciousness: awake  Oxygen Supplementation: face mask  Level of Supplemental Oxygen (L/min / FiO2): 6  Independent Airway: airway patency satisfactory and stable  Dentition: dentition unchanged  Vital Signs Stable: post-procedure vital signs reviewed and stable  Report to RN Given: handoff report given  Patient transferred to: PACU    Handoff Report: Identifed the Patient, Identified the Reponsible Provider, Reviewed the pertinent medical history, Discussed the surgical course, Reviewed Intra-OP anesthesia mangement and issues during anesthesia, Set expectations for post-procedure period and Allowed opportunity for questions and acknowledgement of understanding      Vitals:  Vitals Value Taken Time   BP  15.18 P   Temp 97.9 F    Pulse 90    Resp 18    SpO2 96        Electronically Signed By: CRISTINA Beckham CRNA  July 27, 2021  3:13 PM

## 2021-07-28 LAB
ALBUMIN SERPL-MCNC: 3 G/DL (ref 3.5–5)
ALP SERPL-CCNC: 126 U/L (ref 45–120)
ALT SERPL W P-5'-P-CCNC: 410 U/L (ref 0–45)
ANION GAP SERPL CALCULATED.3IONS-SCNC: 9 MMOL/L (ref 5–18)
AST SERPL W P-5'-P-CCNC: 193 U/L (ref 0–40)
BACTERIA UR CULT: NORMAL
BILIRUB SERPL-MCNC: 2.5 MG/DL (ref 0–1)
BUN SERPL-MCNC: 12 MG/DL (ref 8–28)
CALCIUM SERPL-MCNC: 8.3 MG/DL (ref 8.5–10.5)
CHLORIDE BLD-SCNC: 106 MMOL/L (ref 98–107)
CO2 SERPL-SCNC: 24 MMOL/L (ref 22–31)
CREAT SERPL-MCNC: 0.78 MG/DL (ref 0.6–1.1)
ERYTHROCYTE [DISTWIDTH] IN BLOOD BY AUTOMATED COUNT: 12.6 % (ref 10–15)
GFR SERPL CREATININE-BSD FRML MDRD: 72 ML/MIN/1.73M2
GLUCOSE BLD-MCNC: 144 MG/DL (ref 70–125)
GLUCOSE BLDC GLUCOMTR-MCNC: 117 MG/DL (ref 70–125)
GLUCOSE BLDC GLUCOMTR-MCNC: 129 MG/DL (ref 70–125)
HCT VFR BLD AUTO: 33.3 % (ref 35–47)
HGB BLD-MCNC: 11.1 G/DL (ref 11.7–15.7)
LIPASE SERPL-CCNC: 81 U/L (ref 0–52)
MCH RBC QN AUTO: 32.3 PG (ref 26.5–33)
MCHC RBC AUTO-ENTMCNC: 33.3 G/DL (ref 31.5–36.5)
MCV RBC AUTO: 97 FL (ref 78–100)
PATH REPORT.COMMENTS IMP SPEC: NORMAL
PATH REPORT.COMMENTS IMP SPEC: NORMAL
PATH REPORT.FINAL DX SPEC: NORMAL
PATH REPORT.GROSS SPEC: NORMAL
PATH REPORT.MICROSCOPIC SPEC OTHER STN: NORMAL
PATH REPORT.RELEVANT HX SPEC: NORMAL
PHOTO IMAGE: NORMAL
PLATELET # BLD AUTO: 213 10E3/UL (ref 150–450)
POTASSIUM BLD-SCNC: 4 MMOL/L (ref 3.5–5)
PROT SERPL-MCNC: 6.1 G/DL (ref 6–8)
RBC # BLD AUTO: 3.44 10E6/UL (ref 3.8–5.2)
SODIUM SERPL-SCNC: 139 MMOL/L (ref 136–145)
WBC # BLD AUTO: 14.6 10E3/UL (ref 4–11)

## 2021-07-28 PROCEDURE — 250N000013 HC RX MED GY IP 250 OP 250 PS 637: Performed by: HOSPITALIST

## 2021-07-28 PROCEDURE — 99207 PR CDG-CODE CATEGORY CHANGED: CPT | Performed by: HOSPITALIST

## 2021-07-28 PROCEDURE — 120N000001 HC R&B MED SURG/OB

## 2021-07-28 PROCEDURE — 258N000003 HC RX IP 258 OP 636: Performed by: SURGERY

## 2021-07-28 PROCEDURE — 85018 HEMOGLOBIN: CPT | Performed by: SURGERY

## 2021-07-28 PROCEDURE — 250N000011 HC RX IP 250 OP 636: Performed by: SURGERY

## 2021-07-28 PROCEDURE — 36415 COLL VENOUS BLD VENIPUNCTURE: CPT | Performed by: SURGERY

## 2021-07-28 PROCEDURE — 88304 TISSUE EXAM BY PATHOLOGIST: CPT | Mod: 26 | Performed by: PATHOLOGY

## 2021-07-28 PROCEDURE — 250N000013 HC RX MED GY IP 250 OP 250 PS 637: Performed by: ANESTHESIOLOGY

## 2021-07-28 PROCEDURE — 83690 ASSAY OF LIPASE: CPT | Performed by: SURGERY

## 2021-07-28 PROCEDURE — 80053 COMPREHEN METABOLIC PANEL: CPT | Performed by: SURGERY

## 2021-07-28 PROCEDURE — 99232 SBSQ HOSP IP/OBS MODERATE 35: CPT | Performed by: HOSPITALIST

## 2021-07-28 RX ORDER — LISINOPRIL 20 MG/1
40 TABLET ORAL DAILY
Status: DISCONTINUED | OUTPATIENT
Start: 2021-07-28 | End: 2021-07-30 | Stop reason: HOSPADM

## 2021-07-28 RX ADMIN — HYDROMORPHONE HYDROCHLORIDE 0.2 MG: 0.2 INJECTION, SOLUTION INTRAMUSCULAR; INTRAVENOUS; SUBCUTANEOUS at 19:13

## 2021-07-28 RX ADMIN — TRAZODONE HYDROCHLORIDE 25 MG: 50 TABLET ORAL at 21:20

## 2021-07-28 RX ADMIN — HYDROMORPHONE HYDROCHLORIDE 0.2 MG: 0.2 INJECTION, SOLUTION INTRAMUSCULAR; INTRAVENOUS; SUBCUTANEOUS at 16:09

## 2021-07-28 RX ADMIN — PIPERACILLIN SODIUM AND TAZOBACTAM SODIUM 3.38 G: 3; .375 INJECTION, POWDER, LYOPHILIZED, FOR SOLUTION INTRAVENOUS at 23:58

## 2021-07-28 RX ADMIN — OXYCODONE HYDROCHLORIDE 5 MG: 5 TABLET ORAL at 08:13

## 2021-07-28 RX ADMIN — PIPERACILLIN SODIUM AND TAZOBACTAM SODIUM 3.38 G: 3; .375 INJECTION, POWDER, LYOPHILIZED, FOR SOLUTION INTRAVENOUS at 08:24

## 2021-07-28 RX ADMIN — SODIUM CHLORIDE, POTASSIUM CHLORIDE, SODIUM LACTATE AND CALCIUM CHLORIDE: 600; 310; 30; 20 INJECTION, SOLUTION INTRAVENOUS at 03:47

## 2021-07-28 RX ADMIN — PIPERACILLIN SODIUM AND TAZOBACTAM SODIUM 3.38 G: 3; .375 INJECTION, POWDER, LYOPHILIZED, FOR SOLUTION INTRAVENOUS at 16:20

## 2021-07-28 RX ADMIN — LISINOPRIL 40 MG: 20 TABLET ORAL at 10:07

## 2021-07-28 RX ADMIN — HYDRALAZINE HYDROCHLORIDE 5 MG: 20 INJECTION INTRAMUSCULAR; INTRAVENOUS at 12:50

## 2021-07-28 ASSESSMENT — MIFFLIN-ST. JEOR: SCORE: 1114.52

## 2021-07-28 NOTE — PLAN OF CARE
Problem: Pain Acute  Goal: Acceptable Pain Control and Functional Ability  Intervention: Develop Pain Management Plan  Recent Flowsheet Documentation  Taken 7/28/2021 0409 by Varsha Cunningham, RN  Pain Management Interventions:   rest   repositioned  Taken 7/27/2021 2342 by Varsha Cunningham, RN  Pain Management Interventions:   medication (see MAR)   ambulation/increased activity  Taken 7/27/2021 2154 by Varsha Cunningham, RN  Pain Management Interventions:   medication (see MAR)   ambulation/increased activity   PRN oxcodone given for abdominal pain.  Pt sleeping post intervention.  Upon waking at 0400, pt states that pain is controlled and was pleased with amount of rest received.  Up to the bathroom independently, activity observed.  LELIA drainage serous.  Lab sites dressing are dry and intact.  Upper mid dressing has scant, old bloody drainage.    Pt reports passing flatus.  Bowel sounds present.    Varsha Cunningham, RN

## 2021-07-28 NOTE — PROGRESS NOTES
"ASSESSMENT:  1. Transaminitis    2. Choledocholithiasis        Celina Galicia is a 81 year old female who is s/p laparoscopic cholecystectomy and ERCP on 7/27/2021.  Choledocholithiasis with stone retrieval and severely retracted gallbladder with chronic and acute cholecystitis and purulent drainage    PLAN:  -Continue IV antibiotics  -LELIA drain to remain plans to discharge with the drain in  -Diet per GI  -Anticipate 1 more night stay possibly 2.    SUBJECTIVE:   She is sore but feeling a little better.  No nausea.  Passing gas.  Had a good night sleep and is very happy.  No significant events overnight.      Patient Vitals for the past 24 hrs:   BP Temp Temp src Pulse Resp SpO2 Height Weight   07/28/21 0514 136/66 -- -- -- -- -- -- --   07/28/21 0353 (!) 174/81 98.5  F (36.9  C) Oral 71 18 94 % -- --   07/27/21 2355 (!) 145/70 97.9  F (36.6  C) Axillary 72 17 95 % -- --   07/27/21 1925 (!) 144/67 97.4  F (36.3  C) Oral 74 18 96 % -- --   07/27/21 1820 130/63 97.6  F (36.4  C) Oral 74 18 96 % -- --   07/27/21 1715 136/63 97.5  F (36.4  C) Oral 67 18 95 % -- --   07/27/21 1645 (!) 145/65 98.1  F (36.7  C) Oral 66 18 96 % -- --   07/27/21 1600 -- -- -- -- -- -- 1.575 m (5' 2\") 69.9 kg (154 lb 1 oz)   07/27/21 1545 (!) 177/78 -- -- 69 13 96 % -- --   07/27/21 1544 -- -- -- 71 14 96 % -- --   07/27/21 1515 (!) 219/103 97.9  F (36.6  C) Temporal 90 20 100 % -- --   07/27/21 1125 (!) 157/70 98.5  F (36.9  C) Oral 67 18 94 % -- 67.1 kg (148 lb)         PHYSICAL EXAM:  GEN: No acute distress, comfortable  LUNGS: CTA bilaterally  CV:RRR  ABD: Soft expected TTP postsurgical postop day 1  Drains: Serosanguineous  Output by Drain (mL) 07/26/21 0700 - 07/26/21 1459 07/26/21 1500 - 07/26/21 2259 07/26/21 2300 - 07/27/21 0659 07/27/21 0700 - 07/27/21 1459 07/27/21 1500 - 07/27/21 2259 07/27/21 2300 - 07/28/21 0659 07/28/21 0700 - 07/28/21 0935   Closed/Suction Drain 1 Right RUQ Bulb 15 [place]     20        EXT:No cyanosis, edema " or obvious abnormalities    07/27 0700 - 07/28 0659  In: 1700 [P.O.:480; I.V.:1220]  Out: 675 [Urine:650; Drains:20]    Admission on 07/26/2021   Component Date Value     Hold Specimen 07/26/2021 JIC      Hold Specimen 07/26/2021 JIC      Hold Specimen 07/26/2021 JIC      WBC Count 07/26/2021 8.9      RBC Count 07/26/2021 4.19      Hemoglobin 07/26/2021 13.7      Hematocrit 07/26/2021 40.5      MCV 07/26/2021 97      MCH 07/26/2021 32.7      MCHC 07/26/2021 33.8      RDW 07/26/2021 12.1      Platelet Count 07/26/2021 273      Sodium 07/26/2021 138      Potassium 07/26/2021 4.1      Chloride 07/26/2021 102      Carbon Dioxide (CO2) 07/26/2021 22      Anion Gap 07/26/2021 14      Urea Nitrogen 07/26/2021 13      Creatinine 07/26/2021 0.77      Calcium 07/26/2021 9.4      Glucose 07/26/2021 132*     GFR Estimate 07/26/2021 73      Bilirubin Total 07/26/2021 3.1*     Bilirubin Direct 07/26/2021 1.6*     Protein Total 07/26/2021 7.4      Albumin 07/26/2021 3.9      Alkaline Phosphatase 07/26/2021 163*     AST 07/26/2021 979*     ALT 07/26/2021 641*     Lipase 07/26/2021 65*     Color Urine 07/27/2021 Yellow      Appearance Urine 07/27/2021 Turbid*     Glucose Urine 07/27/2021 Negative      Bilirubin Urine 07/27/2021 0.5 mg/dL*     Ketones Urine 07/27/2021 Negative      Specific Gravity Urine 07/27/2021 1.018      Blood Urine 07/27/2021 Negative      pH Urine 07/27/2021 5.5      Protein Albumin Urine 07/27/2021 10 *     Urobilinogen Urine 07/27/2021 2.0*     Nitrite Urine 07/27/2021 Negative      Leukocyte Esterase Urine 07/27/2021 75 Lonny/uL*     Bacteria Urine 07/27/2021 Few*     RBC Urine 07/27/2021 <1      WBC Urine 07/27/2021 2      Squamous Epithelials Uri* 07/27/2021 1      Lactic Acid 07/26/2021 2.8*     Culture 07/27/2021 Positive on the 1st day of incubation*     Culture 07/27/2021 Gram negative bacilli*     Sodium 07/27/2021 140      Potassium 07/27/2021 3.7      Chloride 07/27/2021 107      Carbon Dioxide (CO2)  07/27/2021 23      Anion Gap 07/27/2021 10      Urea Nitrogen 07/27/2021 12      Creatinine 07/27/2021 0.75      Calcium 07/27/2021 8.4*     Glucose 07/27/2021 117      Alkaline Phosphatase 07/27/2021 147*     AST 07/27/2021 422*     ALT 07/27/2021 575*     Protein Total 07/27/2021 6.2      Albumin 07/27/2021 3.2*     Bilirubin Total 07/27/2021 5.5*     GFR Estimate 07/27/2021 75      WBC Count 07/27/2021 12.6*     RBC Count 07/27/2021 3.64*     Hemoglobin 07/27/2021 12.0      Hematocrit 07/27/2021 34.4*     MCV 07/27/2021 95      MCH 07/27/2021 33.0      MCHC 07/27/2021 34.9      RDW 07/27/2021 12.2      Platelet Count 07/27/2021 244      Lipase 07/27/2021 29      Culture 07/27/2021 Mixed Urogenital Shruti      SARS CoV2 PCR 07/27/2021 Negative      Lactic Acid 07/27/2021 1.4      ERCP 07/27/2021                      Value:[facility]  [address] [place] [address]  _______________________________________________________________________________  Patient Name: Celina Galicia            Procedure Date: 7/27/2021 12:32 PM  MRN: [id number]                       Account Number: [id number]  YOB: 1940               Admit Type: Inpatient  Age: 81                                Note Status: Finalized  Attending MD: Skyler Power MD        Instrument Name: ERCP Scope 3465  _______________________________________________________________________________     Procedure:           ERCP  Indications:         Suspected bile duct stone(s)  Providers:           Skyler Power MD  Patient Profile:     This is an 81 year old female.  Referring MD:          Medicines:           General Anesthesia  Complications:       No immediate complications.  _______________________________________________________________________________  Procedure:           Pre-Anesthesia Assessment:                                                 - Prior to the procedure, a History and Physical was                         performed, and patient medications, allergies and                        sensitivities were reviewed. The patient's tolerance of                        previous anesthesia was reviewed.                       - The risks and benefits of the procedure and the                        sedation options and risks were discussed with the                        patient. All questions were answered and informed                        consent was obtained.                       After obtaining informed consent, the scope was passed                        under direct vision. Throughout the procedure, the                        patient's blood pressure, pulse, and oxygen saturations                        were monitored continuously. The ERCP scope was                        introduced through the mouth, and used to inject                        contrast into and used to inject contrast into the bile                                                  duct. The ERCP was accomplished without difficulty. The                        patient tolerated the procedure well.                                                                                   Findings:       The  film was normal. The esophagus was successfully intubated        under direct vision. The scope was advanced to a normal major papilla in        the descending duodenum without detailed examination of the pharynx,        larynx and associated structures, and upper GI tract. The upper GI tract        was grossly normal. The bile duct was deeply cannulated with the        short-nosed traction sphincterotome. Contrast was injected. The lower        third of the main bile duct contained multiple stones, the largest of        which was 6 mm in diameter. The main bile duct was diffusely dilated,        with a stone causing an obstruction. The largest diameter was 10 mm. A        wire was passed into the biliary tree. A 7 mm biliary sphincterotomy was        ma                           de with a traction (standard) sphincterotome using ERBE        electrocautery. The sphincterotomy oozed blood. The biliary tree was        swept with an 8.5 mm balloon starting at the bifurcation. All stones        were removed.                                                                                   Moderate Sedation:       Moderate (conscious) sedation was personally administered by an        anesthesia professional. The following parameters were monitored: oxygen        saturation, heart rate, blood pressure, and response to care.  Impression:          - The entire main bile duct was dilated, with a stone                        causing an obstruction.                       - Choledocholithiasis was found. Complete removal was                        accomplished by biliary sphincterotomy and balloon                        extraction.                       - A biliary sphincterotomy was performed.                       - The biliary tree was swept.  Recommendation:      - Lap                           jennifer to follow.                       - No anticoagulation for 72 hours.                       - Clear liquid diet for 24 hours.                       - If pain free after 24 hours advance diet slowly as                        tolerated.                       - Return patient to hospital [facility] for ongoing care.                                                                                     Skyler Power MD  _______________  Skyler Power MD  7/27/2021 1:54:02 PM  I was physically present for the entire viewing portion of the exam.  __________________________  Signature of teaching physician  [id number] Power MD  Number of Addenda: 0    Note Initiated On: 7/27/2021 12:32 PM  Scope In: 12:59:36 PM  Scope Out: 1:48:14 PM       Case Report 07/27/2021                      Value:Surgical Pathology Report                         Case: [id number]                                  Authorizing  Provider:  Maurice Cortes DO   Collected:           07/27/2021 02:16 PM          Ordering Location:     [facility]      Received:            07/27/2021 03:12 PM                                 [facility]                                                               Pathologist:           Taylor Bruner MD                                                           Specimen:    Gallbladder, Roof of gallbladder                                                            Final Diagnosis 07/27/2021                      Value:This result contains rich text formatting which cannot be displayed here.     Clinical Information 07/27/2021                      Value:This result contains rich text formatting which cannot be displayed here.     Gross Description 07/27/2021                      Value:This result contains rich text formatting which cannot be displayed here.     Microscopic Description 07/27/2021                      Value:This result contains rich text formatting which cannot be displayed here.     Performing Labs 07/27/2021                      Value:This result contains rich text formatting which cannot be displayed here.     Acinetobacter species 07/27/2021 Not Detected      Citrobacter species 07/27/2021 Not Detected      Enterobacter species 07/27/2021 Not Detected      Proteus species 07/27/2021 Not Detected      Escherichia coli 07/27/2021 Detected*     Klebsiella pneumoniae 07/27/2021 Not Detected      Klebsiella oxytoca 07/27/2021 Not Detected      Pseudomonas aeruginosa 07/27/2021 Not Detected      CTX-M 07/27/2021 Not Detected      KPC 07/27/2021 Not Detected      NDM 07/27/2021 Not Detected      VIM 07/27/2021 Not Detected      IMP 07/27/2021 Not Detected      OXA 07/27/2021 Not Detected      Sodium 07/28/2021 139      Potassium 07/28/2021 4.0      Chloride 07/28/2021 106      Carbon Dioxide (CO2) 07/28/2021 24      Anion Gap 07/28/2021 9      Urea Nitrogen 07/28/2021 12       Creatinine 07/28/2021 0.78      Calcium 07/28/2021 8.3*     Glucose 07/28/2021 144*     Alkaline Phosphatase 07/28/2021 126*     AST 07/28/2021 193*     ALT 07/28/2021 410*     Protein Total 07/28/2021 6.1      Albumin 07/28/2021 3.0*     Bilirubin Total 07/28/2021 2.5*     GFR Estimate 07/28/2021 72      WBC Count 07/28/2021 14.6*     RBC Count 07/28/2021 3.44*     Hemoglobin 07/28/2021 11.1*     Hematocrit 07/28/2021 33.3*     MCV 07/28/2021 97      MCH 07/28/2021 32.3      MCHC 07/28/2021 33.3      RDW 07/28/2021 12.6      Platelet Count 07/28/2021 213      Lipase 07/28/2021 81*     GLUCOSE BY METER POCT 07/28/2021 129*          Benedict Garber PA-C

## 2021-07-28 NOTE — PROGRESS NOTES
Hospitalist Progress Note    Assessment/Plan    Active Problems:    Choledocholithiasis    Transaminitis    81-year-old patient with history of carotid stenosis, hyperlipidemia, hypertension presented to the hospital with right-sided abdominal pain nausea and vomiting elevated LFTs     Abdominal pain due to choledocholithiasis, cholecystitis,  Status post laparoscopic cholecystectomy and ERCP on 7/27, choledocholithiasis with a stone retrieval and severely retracted gallbladder with acute on chronic cholecystitis and purulent drainage  Surgery recommended to continue IV antibiotic and LELIA drain, advance diet as tolerated per GI,           Elevated LFT    alk phos 163 and lipase of 65, trending down  GI consulted, ERCP ordered successful removal of common bile duct stone,     Hyperlipidemia  On statin held right now due to elevated LFT     Hypertension  Blood pressure is elevated, likely secondary to pain, IV hydralazine as needed and resume lisinopril       Suspected UTI  Urine looked infected she denies any symptoms of UTI,     CODE STATUS  Full code    Barriers to Discharge: Pain control, advancing diet    Anticipated discharge date/Disposition: Home in 1 to 2 days    Subjective  Patient seen this morning she states she had a good night sleep, she does not feel comfortable going home today and likely to have her pain better controlled and have a bowel movement if possible    Objective    Vital signs in last 24 hours  Temp:  [97.4  F (36.3  C)-98.5  F (36.9  C)] 98.5  F (36.9  C)  Pulse:  [66-90] 71  Resp:  [13-20] 18  BP: (130-219)/() 177/74  FiO2 (%):  [1 %] 1 %  SpO2:  [94 %-100 %] 94 % [unfilled] O2 Device: None (Room air)    Weight:   [unfilled] Weight change: 5.897 kg (13 lb)    Intake/Output last 3 shifts  I/O last 3 completed shifts:  In: 1700 [P.O.:480; I.V.:1220]  Out: 675 [Urine:650; Drains:20; Blood:5]  Body mass index is 28.18 kg/m .    Physical Exam:  General Appearance: Alert,  oriented x3, does not appear in distress.  HEENT: Normocephalic. No scleral icterus, . Mucous membranes moist.  Heart: :Regular rate and rhythm, normal S1 ,S2, No murmurs, no JVD, no pedal edema   Lungs: Clear to auscultation bilaterally. No wheezing or crackles  Abdomen: Soft, mild diffuse orin laparoscopic incision sites are intact with LELIA drain in place,, no rebound or rigidity, non distended, bowel sounds present.      Pertinent Labs   Lab Results: personally reviewed.   Recent Labs   Lab 07/28/21  0817 07/27/21  0939 07/26/21 2048    140 138   CO2 24 23 22   BUN 12 12 13   ALBUMIN 3.0* 3.2* 3.9   ALKPHOS 126* 147* 163*   * 575* 641*   * 422* 979*     Recent Labs   Lab 07/28/21  0817 07/27/21  0939 07/26/21 2048   WBC 14.6* 12.6* 8.9   HGB 11.1* 12.0 13.7   HCT 33.3* 34.4* 40.5    244 273     No results for input(s): CKTOTAL, TROPONINI in the last 168 hours.    Invalid input(s): TROPONINT, CKMBINDEX  Invalid input(s): POCGLUFGR    Medications  Drug and lactation database from the [place] [employer] Medicine:  http://toxnet.nlm.nih.gov/cgi-bin/sis/htmlgen?LACT        Advanced Care Planning:  Discharge Planning discussed with patient  Total time with this patient is 25 min with 50% of time spent in examining the patient, reviewing records, discussing plan of care and counseling, 50% of time spent in coordination of care.  Care discussed and coordinated with RN, GI team.      Travis Mir MD  Internal Medicine Hospitalist  7/28/2021

## 2021-07-28 NOTE — PLAN OF CARE
Problem: Hypertension Acute  Goal: Blood Pressure Within Desired Range  Outcome: Improving     BP reading was 183/82. Received 5 mg of IV hydralazine. Rechecked /68  Problem: Pain Acute  Goal: Acceptable Pain Control and Functional Ability  Outcome: Improving  Intervention: Develop Pain Management Plan  Recent Flowsheet Documentation  Taken 7/28/2021 0813 by Ozzy Hunt RN  Pain Management Interventions: medication (see MAR)

## 2021-07-28 NOTE — ANESTHESIA POSTPROCEDURE EVALUATION
Patient: Celina Galicia    Procedure(s):  ENDOSCOPIC RETROGRADE CHOLANGIOPANCREATOGRAPHY, BILIARY SPHINCTEROTOMY, STONE REMOVAL  LAPAROSCOPIC SUB TOTAL CHOLECYSTECTOMY    Diagnosis:Choledocholithiasis [K80.50]  Diagnosis Additional Information: No value filed.    Anesthesia Type:  General    Note:  Disposition: Inpatient   Postop Pain Control: Uneventful            Sign Out: Well controlled pain   PONV: No   Neuro/Psych: Uneventful            Sign Out: Acceptable/Baseline neuro status   Airway/Respiratory: Uneventful            Sign Out: Acceptable/Baseline resp. status   CV/Hemodynamics: Uneventful            Sign Out: Acceptable CV status   Other NRE: NONE   DID A NON-ROUTINE EVENT OCCUR? No           Last vitals:  Vitals Value Taken Time   /68 07/27/21 1615   Temp 36.6  C (97.9  F) 07/27/21 1515   Pulse 69 07/27/21 1627   Resp 20 07/27/21 1626   SpO2 97 % 07/27/21 1627   Vitals shown include unvalidated device data.    Electronically Signed By: Yamileth Joyce MD  July 27, 2021  10:01 PM

## 2021-07-28 NOTE — PROGRESS NOTES
Critical lab positive blood culture. Gram negative bacilli/ positive e-coli. Result reported to Dr. Krishnan no new order given patient is on antibiotics.

## 2021-07-28 NOTE — PROGRESS NOTES
"GASTROENTEROLOGY PROGRESS NOTE     SUBJECTIVE: feels better. Tolerating clear liquids. LELIA drain in place. LFT's improving.     OBJECTIVE:   BP (!) 177/74   Pulse 71   Temp 98.5  F (36.9  C) (Oral)   Resp 18   Ht 1.575 m (5' 2\")   Wt 69.9 kg (154 lb 1 oz)   LMP  (LMP Unknown)   SpO2 94%   BMI 28.18 kg/m     Temp (24hrs), Av.9  F (36.6  C), Min:97.4  F (36.3  C), Max:98.5  F (36.9  C)     Patient Vitals for the past 72 hrs:   Weight   21 1600 69.9 kg (154 lb 1 oz)   21 1125 67.1 kg (148 lb)   21 61.2 kg (135 lb)        Intake/Output Summary (Last 24 hours) at 2021 1124  Last data filed at 2021 1007  Gross per 24 hour   Intake 2347 ml   Output 1275 ml   Net 1072 ml      PHYSICAL EXAM   Constitutional: Healthy, in bed, no acute distress  Cardiovascular: Regular rate and rhythm.   Respiratory: Clear to auscultation bilaterally, respirations non labored.   Abdomen: Soft, non-tender, non-distended, normally active bowel sounds. LELIA drain present with bloody output.   Additional Comments:   ROS, FH, SH: See initial GI consult for details.     I have reviewed the patient's new clinical lab results:   Recent Labs   Lab Test 21  0817 21  0939 21   WBC 14.6* 12.6* 8.9   HGB 11.1* 12.0 13.7   MCV 97 95 97    244 273      Recent Labs   Lab Test 21  0817 21  0939 21    140 138   POTASSIUM 4.0 3.7 4.1   CHLORIDE 106 107 102   CO2 24 23 22   BUN 12 12 13   CR 0.78 0.75 0.77   ANIONGAP 9 10 14   KAI 8.3* 8.4* 9.4      Recent Labs   Lab Test 21  0817 21  0939 21  0054 21  1518   ALBUMIN 3.0* 3.2*  --  3.9  --    BILITOTAL 2.5* 5.5*  --  3.1*  --    * 575*  --  641*  --    * 422*  --  979*  --    ALKPHOS 126* 147*  --  163*  --    PROTEIN  --   --  10 *  --  100 mg/dL*   LIPASE 81* 29  --  65*  --     ERCP with Dr. Skyler Power 2021  Impression:          - The entire main bile " duct was dilated, with a stone                        causing an obstruction.                        - Choledocholithiasis was found. Complete removal was                        accomplished by biliary sphincterotomy and balloon                        extraction.                        - A biliary sphincterotomy was performed.                        - The biliary tree was swept.   Recommendation:      - Lap jennifer to follow.                        - No anticoagulation for 72 hours.                        - Clear liquid diet for 24 hours.                        - If pain free after 24 hours advance diet slowly as                        tolerated.                        - Return patient to hospital [facility] for ongoing care.     Cholecystectomy 7/27 with acute cholecystitis and purulent drainage.   Assessment:  Celina Galicia is an 81 year old female who presents with cholecystitis and choledocholithiasis s/p lap cholecystectomy with cholecystitis and purulent drainage, ERCP with ERS and stone removal who is doing better today with improvement of LFT's and tolerating a full liquid diet. LELIA with bloody drainage. She is feeling better on antibiotics with plans for one more day prior to discharge.     1. Abnormal LFT's related to choledocholithiasis s/p ERCP with stone removal now improving.     2. Cholecystitis s/p lap jennifer on antibiotics.    Plan:    LFT AM  Advance diet to regular low fat.     Dipti Mariscal [facility]   Cell phone    After hours call  for on call MD

## 2021-07-29 LAB
ALBUMIN SERPL-MCNC: 2.8 G/DL (ref 3.5–5)
ALP SERPL-CCNC: 106 U/L (ref 45–120)
ALT SERPL W P-5'-P-CCNC: 250 U/L (ref 0–45)
ANION GAP SERPL CALCULATED.3IONS-SCNC: 8 MMOL/L (ref 5–18)
AST SERPL W P-5'-P-CCNC: 71 U/L (ref 0–40)
BACTERIA BLD CULT: ABNORMAL
BACTERIA BLD CULT: ABNORMAL
BILIRUB DIRECT SERPL-MCNC: 0.6 MG/DL
BILIRUB SERPL-MCNC: 1.1 MG/DL (ref 0–1)
BUN SERPL-MCNC: 12 MG/DL (ref 8–28)
CALCIUM SERPL-MCNC: 8.1 MG/DL (ref 8.5–10.5)
CHLORIDE BLD-SCNC: 109 MMOL/L (ref 98–107)
CO2 SERPL-SCNC: 24 MMOL/L (ref 22–31)
CREAT SERPL-MCNC: 0.71 MG/DL (ref 0.6–1.1)
ERYTHROCYTE [DISTWIDTH] IN BLOOD BY AUTOMATED COUNT: 12.8 % (ref 10–15)
GFR SERPL CREATININE-BSD FRML MDRD: 80 ML/MIN/1.73M2
GLUCOSE BLD-MCNC: 100 MG/DL (ref 70–125)
GLUCOSE BLDC GLUCOMTR-MCNC: 105 MG/DL (ref 70–125)
GLUCOSE BLDC GLUCOMTR-MCNC: 132 MG/DL (ref 70–125)
HCT VFR BLD AUTO: 32.4 % (ref 35–47)
HGB BLD-MCNC: 10.9 G/DL (ref 11.7–15.7)
LIPASE SERPL-CCNC: 119 U/L (ref 0–52)
MCH RBC QN AUTO: 32.9 PG (ref 26.5–33)
MCHC RBC AUTO-ENTMCNC: 33.6 G/DL (ref 31.5–36.5)
MCV RBC AUTO: 98 FL (ref 78–100)
PLATELET # BLD AUTO: 219 10E3/UL (ref 150–450)
POTASSIUM BLD-SCNC: 3.8 MMOL/L (ref 3.5–5)
PROT SERPL-MCNC: 5.8 G/DL (ref 6–8)
RBC # BLD AUTO: 3.31 10E6/UL (ref 3.8–5.2)
SODIUM SERPL-SCNC: 141 MMOL/L (ref 136–145)
WBC # BLD AUTO: 9.5 10E3/UL (ref 4–11)

## 2021-07-29 PROCEDURE — 82248 BILIRUBIN DIRECT: CPT | Performed by: NURSE PRACTITIONER

## 2021-07-29 PROCEDURE — 82040 ASSAY OF SERUM ALBUMIN: CPT | Performed by: SURGERY

## 2021-07-29 PROCEDURE — 120N000001 HC R&B MED SURG/OB

## 2021-07-29 PROCEDURE — 250N000013 HC RX MED GY IP 250 OP 250 PS 637: Performed by: HOSPITALIST

## 2021-07-29 PROCEDURE — 83690 ASSAY OF LIPASE: CPT | Performed by: SURGERY

## 2021-07-29 PROCEDURE — 250N000013 HC RX MED GY IP 250 OP 250 PS 637: Performed by: SURGERY

## 2021-07-29 PROCEDURE — 250N000011 HC RX IP 250 OP 636: Performed by: SURGERY

## 2021-07-29 PROCEDURE — 36415 COLL VENOUS BLD VENIPUNCTURE: CPT | Performed by: NURSE PRACTITIONER

## 2021-07-29 PROCEDURE — 250N000013 HC RX MED GY IP 250 OP 250 PS 637: Performed by: ANESTHESIOLOGY

## 2021-07-29 PROCEDURE — 85027 COMPLETE CBC AUTOMATED: CPT | Performed by: SURGERY

## 2021-07-29 PROCEDURE — 99233 SBSQ HOSP IP/OBS HIGH 50: CPT | Performed by: EMERGENCY MEDICINE

## 2021-07-29 RX ADMIN — PIPERACILLIN SODIUM AND TAZOBACTAM SODIUM 3.38 G: 3; .375 INJECTION, POWDER, LYOPHILIZED, FOR SOLUTION INTRAVENOUS at 08:58

## 2021-07-29 RX ADMIN — TRAMADOL HYDROCHLORIDE 50 MG: 50 TABLET, FILM COATED ORAL at 09:05

## 2021-07-29 RX ADMIN — OXYCODONE HYDROCHLORIDE 5 MG: 5 TABLET ORAL at 20:51

## 2021-07-29 RX ADMIN — LISINOPRIL 40 MG: 20 TABLET ORAL at 08:58

## 2021-07-29 RX ADMIN — OXYCODONE HYDROCHLORIDE 5 MG: 5 TABLET ORAL at 00:08

## 2021-07-29 RX ADMIN — PIPERACILLIN SODIUM AND TAZOBACTAM SODIUM 3.38 G: 3; .375 INJECTION, POWDER, LYOPHILIZED, FOR SOLUTION INTRAVENOUS at 15:24

## 2021-07-29 RX ADMIN — TRAZODONE HYDROCHLORIDE 25 MG: 50 TABLET ORAL at 20:46

## 2021-07-29 ASSESSMENT — MIFFLIN-ST. JEOR: SCORE: 1107.72

## 2021-07-29 NOTE — PLAN OF CARE
PRN dilaudid given for pain; relief reported. Patient reported passing gas. SBA with activity. Patient tolerating regular diet. Patient using incentive spirometry. LELIA with little amount of sanguinous drainage.     Problem: Adult Inpatient Plan of Care  Goal: Optimal Comfort and Wellbeing  Outcome: Improving  Goal: Readiness for Transition of Care  Outcome: Improving     Problem: Pain Acute  Goal: Acceptable Pain Control and Functional Ability  Outcome: Improving  Intervention: Develop Pain Management Plan  Recent Flowsheet Documentation  Taken 7/28/2021 1913 by Courtney Pittman, RN  Pain Management Interventions:    medication (see MAR)    distraction    emotional support  Taken 7/28/2021 1609 by Courtney Pittman, RN  Pain Management Interventions:    medication (see MAR)    distraction    emotional support

## 2021-07-29 NOTE — PLAN OF CARE
Problem: Pain Acute  Goal: Acceptable Pain Control and Functional Ability  Outcome: Improving  Intervention: Develop Pain Management Plan  Recent Flowsheet Documentation  Taken 7/29/2021 0008 by Varsha Cunningham, RN  Pain Management Interventions:   medication (see MAR)   rest   Oxycodone given PRN for right and left lower abdominal pain.  Lower abdominal slightly distended; pt passing flatus.  Denies nausea.  Urinating without difficulty.    Varsha Cunningham, RN

## 2021-07-29 NOTE — PLAN OF CARE
Problem: Adult Inpatient Plan of Care  Goal: Absence of Hospital-Acquired Illness or Injury  Intervention: Prevent Skin Injury  Recent Flowsheet Documentation  Taken 7/29/2021 1016 by Ozzy Hunt RN  Body Position: position changed independently     Problem: Pain Acute  Goal: Acceptable Pain Control and Functional Ability  Outcome: Improving  Intervention: Develop Pain Management Plan  Recent Flowsheet Documentation  Taken 7/29/2021 0914 by Ozzy Hunt RN  Pain Management Interventions: medication (see MAR)

## 2021-07-29 NOTE — PROGRESS NOTES
Chart note    Chart reviewed, LFT continue to improve. Symptoms stable.     GI will sign off, please call with questions or concerns.     Dipti Mariscal CNP    [phone]

## 2021-07-29 NOTE — PROGRESS NOTES
Daily Progress Note    Assessment/Plan:  81-year-old female presented with cholecystitis and choledocholithiasis (with stone removal).  Status post lap jennifer and ERCP on July 27.  Has LELIA drain.  Blood cultures positive for E. coli evening of June 27.  Sensitive to Zosyn, which she has been receiving.    1.  E. coli bacteremia: Has been on IV Zosyn.  Sensitivities as follows:  4.0 ug/mL Susceptible       Ampicillin/ Sulbactam 4.0 ug/mL Susceptible     Cefepime <=1.0 ug/mL Susceptible     Ceftazidime <=1.0 ug/mL Susceptible     Ceftriaxone <=1.0 ug/mL Susceptible     Ciprofloxacin 0.5 ug/mL Intermediate     Gentamicin <=1.0 ug/mL Susceptible     Levofloxacin 1.0 ug/mL Intermediate     Meropenem <=0.25 ug/mL Susceptible     Piperacillin/Tazobactam <=4.0 ug/mL Susceptible     Tobramycin <=1.0 ug/mL Susceptible     Trimethoprim/Sulfa <=1/19 ug/mL Susceptible        We will monitor 1 more night on IV antibiotics and consider discharge on Augmentin.    2.  Cholecystitis/choledocholithiasis: Status post lap jennifer and ERCP July 27.  She is improving daily and tolerating her diet.  GI and surgery had been following.  Has a LELIA drain which will be continued on discharge.  Continue IV antibiotics for now.    3.  Hypertension: Home lisinopril resumed    4.  Abnormal hepatic panel: Secondary to the above, improving daily, continue to trend.  Lipase went up slightly today, will trend        Code status:Full Code        Barriers to Discharge: Bacteremia, mildly increased lipase    Disposition: Anticipate discharge in 1 to 2 days    Subjective:  Celina is new to me today, chart reviewed and discussed with staff.  She feels better each day.  Her pain is improved each day.  Her appetite and oral intake are improving, she has been passing flatus, no bowel movement yet.  I offered to call and update her daughter but she declined.        Current Medications Reviewed via EHR List    Objective:  Vital signs in last 24  hours:  [unfilled]  .prog  Weight:   @THISENCWEIGHTS(1)@  Weight change: 2.495 kg (5 lb 8 oz)  Body mass index is 28.08 kg/m .    Intake/Output last 3 shifts:  I/O last 3 completed shifts:  In: 1587 [P.O.:1080; I.V.:507]  Out: 1860 [Urine:1830; Drains:30]  Intake/Output this shift:  I/O this shift:  In: 240 [P.O.:240]  Out: -     Physical Exam:  General: No apparent distress, pleasant and comfortable  CV: Regular rate and rhythm  Lungs: Clear to auscultation  Abdomen: Mild upper abdominal expected tenderness  Skin: No jaundice        Imaging:  Personally Reviewed.  Abdomen US, limited (RUQ only)    Result Date: 7/27/2021  EXAM: US ABDOMEN LIMITED LOCATION: [facility] DATE/TIME: 7/27/2021 12:12 AM INDICATION: ruq pain, hx of gb disease COMPARISON: CT abdomen and pelvis 09/26/2020 and abdominal ultrasound 09/26/2020. TECHNIQUE: Limited abdominal ultrasound. FINDINGS: GALLBLADDER: Suboptimally assessed due to incomplete distention. Wall thickening measuring up to 5 mm. Probable echogenic polyp measuring 7 x 5 x 6 mm. BILE DUCTS: No intrahepatic biliary dilatation. The common duct measures 7 mm. LIVER: Diffusely echogenic compatible with fatty infiltration. No focal lesions. No focal mass. RIGHT KIDNEY: No hydronephrosis. PANCREAS: The visualized portions are normal. No ascites.     IMPRESSION: 1.  The gallbladder is contracted and suboptimally assessed. Recommend repeat study after fasting. Probable polyp. 2.  Echogenic liver compatible with fatty infiltration. 3.  Borderline dilated common bile duct.     XR ERCP    Result Date: 7/27/2021  EXAM: XR ERCP LOCATION: [facility] DATE/TIME: 7/27/2021 12:21 PM INDICATION: elevated lfts COMPARISON: Abdominal ultrasound dated 07/27/2021. TECHNIQUE: Exam performed by gastroenterologist. FLUOROSCOPIC TIME: 6.2 minutes NUMBER OF IMAGES: 8 FINDINGS: BILE DUCTS: The common bile duct is mildly prominent measuring approximately 8 mm  in diameter. There is a possible filling defect in the common duct just superior to the endoscope on image 4. Subsequent images show sweeping of the common bile duct with balloon catheter following the balloon catheter the filling defect is no longer visualized consistent with successful removal of common duct stone. No complications are identified. PANCREATIC DUCT: Not imaged.     IMPRESSION: 1.  Choledocholithiasis with removal of common duct stone. No complications are identified.      Lab Results:  Personally Reviewed.   Fingerstick Blood Glucose: @PVGERBE84WCW(POCGLUFGR:10)@    Last Hbg A1C: No results found for: HGBA1C   No results found for: INR, PROTIME  Recent Results (from the past 24 hour(s))   Glucose by meter    Collection Time: 07/28/21  6:23 PM   Result Value Ref Range    GLUCOSE BY METER POCT 117 70 - 125 mg/dL   Glucose by meter    Collection Time: 07/29/21  6:47 AM   Result Value Ref Range    GLUCOSE BY METER POCT 105 70 - 125 mg/dL   Comprehensive metabolic panel    Collection Time: 07/29/21  7:32 AM   Result Value Ref Range    Sodium 141 136 - 145 mmol/L    Potassium 3.8 3.5 - 5.0 mmol/L    Chloride 109 (H) 98 - 107 mmol/L    Carbon Dioxide (CO2) 24 22 - 31 mmol/L    Anion Gap 8 5 - 18 mmol/L    Urea Nitrogen 12 8 - 28 mg/dL    Creatinine 0.71 0.60 - 1.10 mg/dL    Calcium 8.1 (L) 8.5 - 10.5 mg/dL    Glucose 100 70 - 125 mg/dL    Alkaline Phosphatase 106 45 - 120 U/L    AST 71 (H) 0 - 40 U/L     (H) 0 - 45 U/L    Protein Total 5.8 (L) 6.0 - 8.0 g/dL    Albumin 2.8 (L) 3.5 - 5.0 g/dL    Bilirubin Total 1.1 (H) 0.0 - 1.0 mg/dL    GFR Estimate 80 >60 mL/min/1.73m2   CBC with platelets    Collection Time: 07/29/21  7:32 AM   Result Value Ref Range    WBC Count 9.5 4.0 - 11.0 10e3/uL    RBC Count 3.31 (L) 3.80 - 5.20 10e6/uL    Hemoglobin 10.9 (L) 11.7 - 15.7 g/dL    Hematocrit 32.4 (L) 35.0 - 47.0 %    MCV 98 78 - 100 fL    MCH 32.9 26.5 - 33.0 pg    MCHC 33.6 31.5 - 36.5 g/dL    RDW 12.8 10.0  - 15.0 %    Platelet Count 219 150 - 450 10e3/uL   Lipase    Collection Time: 07/29/21  7:32 AM   Result Value Ref Range    Lipase 119 (H) 0 - 52 U/L   Bilirubin direct    Collection Time: 07/29/21  7:32 AM   Result Value Ref Range    Bilirubin Direct 0.6 (H) <=0.5 mg/dL           Advanced Care Planning    Time > 35 minutes with greater than 50% of time spent in counseling and coordination of care.     Sarwat Perry MD  Date: 7/29/2021  Time: 1:14 PM  [facility]  Family [facility]

## 2021-07-29 NOTE — PROGRESS NOTES
Celina Galicia is doing well, tolerating clear liquids minimal pain        Vitals:    07/28/21 1856 07/28/21 2015 07/28/21 2320 07/29/21 0737   BP:  127/60 135/63 (!) 158/73   BP Location:  Right arm Right arm Right arm   Pulse:  81 67 61   Resp:  18 18 20   Temp:  98.6  F (37  C) 98.4  F (36.9  C) 98.2  F (36.8  C)   TempSrc:  Temporal Oral Oral   SpO2:  93% 94% 94%   Weight: 69.6 kg (153 lb 8 oz)      Height:           PHYSICAL EXAM:  GEN: No acute distress, comfortable  ABD: Soft, LELIA drain with serosanguineous output, dressings intact  EXT:No cyanosis, edema or obvious abnormalities        Recent Labs   Lab 07/29/21  0732   WBC 9.5   HGB 10.9*   HCT 32.4*          Recent Labs   Lab 07/29/21  0732      CO2 24   BUN 12   ALBUMIN 2.8*   ALKPHOS 106   *   AST 71*         A/P:  Celina Galicia is s/p laparoscopic cholecystectomy  Doing well, can discharge home from surgery standpoint  -LELIA drain remain in place  -Diet as tolerated    Maurice Cortes DO D.O. FACS  [phone]  [facility] Department of Surgery

## 2021-07-30 VITALS
HEIGHT: 62 IN | BODY MASS INDEX: 27.97 KG/M2 | SYSTOLIC BLOOD PRESSURE: 152 MMHG | DIASTOLIC BLOOD PRESSURE: 69 MMHG | OXYGEN SATURATION: 95 % | HEART RATE: 74 BPM | RESPIRATION RATE: 16 BRPM | TEMPERATURE: 98.8 F | WEIGHT: 152 LBS

## 2021-07-30 PROBLEM — B96.20 E COLI BACTEREMIA: Status: ACTIVE | Noted: 2021-07-30

## 2021-07-30 PROBLEM — Z98.890 S/P ERCP: Chronic | Status: ACTIVE | Noted: 2021-07-30

## 2021-07-30 PROBLEM — Z98.890 S/P ERCP: Status: ACTIVE | Noted: 2021-07-30

## 2021-07-30 PROBLEM — Z90.49 S/P LAPAROSCOPIC CHOLECYSTECTOMY: Chronic | Status: ACTIVE | Noted: 2021-07-30

## 2021-07-30 PROBLEM — Z90.49 S/P LAPAROSCOPIC CHOLECYSTECTOMY: Status: ACTIVE | Noted: 2021-07-30

## 2021-07-30 PROBLEM — I65.29 ASYMPTOMATIC CAROTID ARTERY STENOSIS: Status: ACTIVE | Noted: 2021-07-30

## 2021-07-30 PROBLEM — I10 ESSENTIAL HYPERTENSION: Status: ACTIVE | Noted: 2021-07-30

## 2021-07-30 PROBLEM — R78.81 E COLI BACTEREMIA: Status: ACTIVE | Noted: 2021-07-30

## 2021-07-30 PROBLEM — E78.5 HYPERLIPIDEMIA: Status: ACTIVE | Noted: 2021-07-30

## 2021-07-30 LAB
ALBUMIN SERPL-MCNC: 2.9 G/DL (ref 3.5–5)
ALP SERPL-CCNC: 107 U/L (ref 45–120)
ALT SERPL W P-5'-P-CCNC: 176 U/L (ref 0–45)
ANION GAP SERPL CALCULATED.3IONS-SCNC: 4 MMOL/L (ref 5–18)
AST SERPL W P-5'-P-CCNC: 46 U/L (ref 0–40)
BILIRUB SERPL-MCNC: 1.1 MG/DL (ref 0–1)
BUN SERPL-MCNC: 10 MG/DL (ref 8–28)
CALCIUM SERPL-MCNC: 8.4 MG/DL (ref 8.5–10.5)
CHLORIDE BLD-SCNC: 108 MMOL/L (ref 98–107)
CO2 SERPL-SCNC: 28 MMOL/L (ref 22–31)
CREAT SERPL-MCNC: 0.69 MG/DL (ref 0.6–1.1)
ERYTHROCYTE [DISTWIDTH] IN BLOOD BY AUTOMATED COUNT: 12.6 % (ref 10–15)
GFR SERPL CREATININE-BSD FRML MDRD: 82 ML/MIN/1.73M2
GLUCOSE BLD-MCNC: 93 MG/DL (ref 70–125)
GLUCOSE BLDC GLUCOMTR-MCNC: 147 MG/DL (ref 70–125)
GLUCOSE BLDC GLUCOMTR-MCNC: 98 MG/DL (ref 70–125)
HCT VFR BLD AUTO: 37.2 % (ref 35–47)
HGB BLD-MCNC: 12.1 G/DL (ref 11.7–15.7)
LIPASE SERPL-CCNC: 198 U/L (ref 0–52)
MCH RBC QN AUTO: 32.1 PG (ref 26.5–33)
MCHC RBC AUTO-ENTMCNC: 32.5 G/DL (ref 31.5–36.5)
MCV RBC AUTO: 99 FL (ref 78–100)
PLATELET # BLD AUTO: 254 10E3/UL (ref 150–450)
POTASSIUM BLD-SCNC: 4.1 MMOL/L (ref 3.5–5)
PROT SERPL-MCNC: 6.2 G/DL (ref 6–8)
RBC # BLD AUTO: 3.77 10E6/UL (ref 3.8–5.2)
SODIUM SERPL-SCNC: 140 MMOL/L (ref 136–145)
WBC # BLD AUTO: 7.5 10E3/UL (ref 4–11)

## 2021-07-30 PROCEDURE — 250N000011 HC RX IP 250 OP 636: Performed by: SURGERY

## 2021-07-30 PROCEDURE — 250N000013 HC RX MED GY IP 250 OP 250 PS 637: Performed by: HOSPITALIST

## 2021-07-30 PROCEDURE — 83690 ASSAY OF LIPASE: CPT | Performed by: SURGERY

## 2021-07-30 PROCEDURE — 85027 COMPLETE CBC AUTOMATED: CPT | Performed by: SURGERY

## 2021-07-30 PROCEDURE — 250N000013 HC RX MED GY IP 250 OP 250 PS 637: Performed by: ANESTHESIOLOGY

## 2021-07-30 PROCEDURE — 99239 HOSP IP/OBS DSCHRG MGMT >30: CPT | Performed by: INTERNAL MEDICINE

## 2021-07-30 PROCEDURE — 36415 COLL VENOUS BLD VENIPUNCTURE: CPT | Performed by: SURGERY

## 2021-07-30 PROCEDURE — 80053 COMPREHEN METABOLIC PANEL: CPT | Performed by: SURGERY

## 2021-07-30 RX ORDER — OXYCODONE HYDROCHLORIDE 5 MG/1
5 TABLET ORAL EVERY 4 HOURS PRN
Qty: 12 TABLET | Refills: 0 | Status: SHIPPED | OUTPATIENT
Start: 2021-07-30 | End: 2021-07-30

## 2021-07-30 RX ORDER — OXYCODONE HYDROCHLORIDE 5 MG/1
5 TABLET ORAL EVERY 4 HOURS PRN
Qty: 12 TABLET | Refills: 0 | Status: SHIPPED | OUTPATIENT
Start: 2021-07-30

## 2021-07-30 RX ADMIN — PIPERACILLIN SODIUM AND TAZOBACTAM SODIUM 3.38 G: 3; .375 INJECTION, POWDER, LYOPHILIZED, FOR SOLUTION INTRAVENOUS at 08:04

## 2021-07-30 RX ADMIN — HYDRALAZINE HYDROCHLORIDE 5 MG: 20 INJECTION INTRAMUSCULAR; INTRAVENOUS at 09:14

## 2021-07-30 RX ADMIN — OXYCODONE HYDROCHLORIDE 5 MG: 5 TABLET ORAL at 03:57

## 2021-07-30 RX ADMIN — LISINOPRIL 40 MG: 20 TABLET ORAL at 08:04

## 2021-07-30 RX ADMIN — OXYCODONE HYDROCHLORIDE 5 MG: 5 TABLET ORAL at 08:12

## 2021-07-30 RX ADMIN — PIPERACILLIN SODIUM AND TAZOBACTAM SODIUM 3.38 G: 3; .375 INJECTION, POWDER, LYOPHILIZED, FOR SOLUTION INTRAVENOUS at 00:13

## 2021-07-30 RX ADMIN — OXYCODONE HYDROCHLORIDE 5 MG: 5 TABLET ORAL at 12:51

## 2021-07-30 NOTE — PROGRESS NOTES
Celina Galicia is doing well, tolerating diet, no n/v, passing gas        Vitals:    07/29/21 2300 07/30/21 0004 07/30/21 0740 07/30/21 0855   BP: (!) 177/84 138/73 (!) 176/80 (!) 178/88   BP Location: Right arm Right arm Right arm    Pulse: 62  60 63   Resp: 16  16    Temp: 97.1  F (36.2  C)  98  F (36.7  C)    TempSrc: Temporal  Oral    SpO2: 96%  94%    Weight:       Height:           PHYSICAL EXAM:  GEN: No acute distress, comfortable  ABD: Soft, LELIA drain with serosanguineous output, dressings intact  EXT:No cyanosis, edema or obvious abnormalities        Recent Labs   Lab 07/30/21  0914   WBC 7.5   HGB 12.1   HCT 37.2          Recent Labs   Lab 07/30/21  0914      CO2 28   BUN 10   ALBUMIN 2.9*   ALKPHOS 107   *   AST 46*         A/P:  Celina Galicia is s/p laparoscopic cholecystectomy  Doing well, can discharge home from surgery standpoint  -LELIA drain remain in place, f/u in 1 week  -Diet as tolerated    Fan Houston PA-C  Pager - [phone]  Phone - [phone]   General Surgery

## 2021-07-30 NOTE — DISCHARGE INSTRUCTIONS
Follow up: It is our practice to have all patients follow up with us 2-3 weeks after their surgery to ensure they are recovering well.  For straightforward laparoscopic procedures, this can be done either in clinic as a scheduled follow up appointment, or over the phone.  If you would like a scheduled follow up appointment in clinic, please call us at [phone] to schedule an appointment at your convenience.  If you would prefer to follow up with us by phone please let us know so that we may contact you 2-3 weeks following your procedure.        Diet: Regular diet. Patients can have difficulty with constipation following surgery, due in part to the administration of narcotic medications.  If you are suffering with constipation, you should avoid foods such as hard cheeses or red meat.  Foods high in fiber are recommended.      Activity: You should continue to be active at home, including ambulating frequently.  If possible try to limit the amount of time spent in bed.    Restrictions: You have no lifting restrictions post operatively, but may wish to avoid strenuous physical activity for 1-2 weeks.  You should limit your physical activity if it causes you discomfort; however, this should resolve within 1-2 weeks.   Walking does not count as strenuous physical activity.  You are safe to walk up and down stairs.  Following 2 weeks you may resume all normal physical activity.    Wound / drain care: You may remove your Band-aids after a period of 48 hours.  The small white strips on the incisions act like artificial scabs, and will begin to peel at the edges at around 7-10 days.  These can then be removed.    It is normal to have a small rim of red present around the incisions. This should not, however, extend beyond 1/4 inch from the incision.  If your incisions become increasingly tender, red, or draining, please contact us.       Bathing: You may shower after 48 hours from surgery.  It is ok to get your incisions  wet, but avoid rubbing them.  Avoid soaking in bath tubs, or swimming in lakes, pools, or streams for 4 weeks following surgery.

## 2021-07-30 NOTE — PLAN OF CARE
Problem: Hypertension Acute  Goal: Blood Pressure Within Desired Range  Outcome: Improving   BP elevated at 176/80 in the A.M . Scheduled antihypertensive given and recheck was 178/88. PRN Hydralazine then given and BP currently 152/69.    Problem: Pain Acute  Goal: Acceptable Pain Control and Functional Ability  Outcome: Improving  Intervention: Develop Pain Management Plan  Recent Flowsheet Documentation  Taken 7/30/2021 0802 by Dalia Herrera RN  Pain Management Interventions: medication (see MAR)   Patient c/o surgical abdominal pain rated 4/10. PRN Oxycodone given with relief.     Problem: Adult Inpatient Plan of Care  Goal: Patient-Specific Goal (Surgical site)  Outcome: Improving   Dressing to abd CDI. No s/s of infection noted. Patient afebrile this shift. LELIA drain in place with scant serosanguinous drainage. Patient educated on how to empty drain and to document output.  -Discharge instructions and medication list reviewed and patient verbalized understanding. Encouraged to start antibiotics today.

## 2021-07-30 NOTE — PLAN OF CARE
Problem: Adult Inpatient Plan of Care  Goal: Absence of Hospital-Acquired Illness or Injury  Intervention: Prevent Skin Injury  Recent Flowsheet Documentation  Taken 7/30/2021 0000 by Varsha Cunningham, RN  Body Position: position changed independently  Taken 7/29/2021 2051 by Varsha Cunningham, RN  Body Position: position changed independently   LELIA dressing changed.  Minimal to scant drainage in LELIA bulb.    Lap site CDI.    Varsha Cunningham, RN

## 2021-08-01 NOTE — DISCHARGE SUMMARY
[facility] MEDICINE  DISCHARGE SUMMARY     Primary Care Physician: No Ref-Primary, Physician  Admission Date: 7/26/2021   Discharge Provider: Kurt Chiang MD Discharge Date: 7/30/2021   Diet:   Active Diet and Nourishment Order   Procedures     Diet   REGULAR    Code Status: Prior   Activity: DCACTIVITY: Activity as tolerated        Condition at Discharge: Stable     REASON FOR PRESENTATION(See Admission Note for Details)   ABDOMINAL PAIN    PRINCIPAL & ACTIVE DISCHARGE DIAGNOSES     Principal Problem:    Choledocholithiasis  Active Problems:    E coli bacteremia    Transaminitis    Essential hypertension    Hyperlipidemia    S/P ERCP    S/P laparoscopic cholecystectomy      PENDING LABS     Unresulted Labs Ordered in the Past 30 Days of this Admission     No orders found from 6/26/2021 to 7/27/2021.            PROCEDURES ( this hospitalization only)      Procedure(s):  ENDOSCOPIC RETROGRADE CHOLANGIOPANCREATOGRAPHY, BILIARY SPHINCTEROTOMY, STONE REMOVAL  LAPAROSCOPIC SUB TOTAL CHOLECYSTECTOMY     Maurice Cortes,    Surgeon: Skyler Power MD    Procedure(s):  ENDOSCOPIC RETROGRADE CHOLANGIOPANCREATOGRAPHY, BILIARY SPHINCTEROTOMY, STONE REMOVAL  LAPAROSCOPIC SUB TOTAL CHOLECYSTECTOMY   Pre-Procedure Diagnosis:  Choledocholithiasis [K80.50]    Post-Procedure Diagnosis:    Contracted gallbladder  Impression:          - The entire main bile duct was dilated, with a stone                        causing an obstruction.                        - Choledocholithiasis was found. Complete removal was                        accomplished by biliary sphincterotomy and balloon                        extraction.                        - A biliary sphincterotomy was performed.                        - The biliary tree was swept.   Recommendation:      - Lap jennifer to follow.                        - No anticoagulation for 72 hours.                        - Clear liquid diet for 24  hours.                        - If pain free after 24 hours advance diet slowly as                        tolerated.                        - Return patient to hospital [facility] for ongoing care     IMPRESSION:/US  1.  The gallbladder is contracted and suboptimally assessed. Recommend repeat study after fasting. Probable polyp.  2.  Echogenic liver compatible with fatty infiltration.  3.  Borderline dilated common bile duct.        FINDINGS:   BILE DUCTS: The common bile duct is mildly prominent measuring approximately 8 mm in diameter. There is a possible filling defect in the common duct just superior to the endoscope on image 4. Subsequent images show sweeping of the common bile duct with   balloon catheter following the balloon catheter the filling defect is no longer visualized consistent with successful removal of common duct stone. No complications are identified.     PANCREATIC DUCT: Not imaged.                                                                      IMPRESSION:  1.  Choledocholithiasis with removal of common duct stone. No complications are identified.        RECOMMENDATIONS TO OUTPATIENT PROVIDER FOR F/U VISIT     Follow-up Appointments     Follow-up and recommended labs and tests       Follow up as planned with surgery next week               DISPOSITION     Home    SUMMARY OF HOSPITAL COURSE:    Presented with elevated LFTS abdominal pain and evidence of choledocholithiasis taken to OR for LAP jennifer and successful ERCP.Post op BC + for ecoli responded to IV ATB with plans to finish course oral Augmentin as outpatient    Discharge Medications with Med changes:     Discharge Medication List as of 7/30/2021 11:43 AM      START taking these medications    Details   amoxicillin-clavulanate (AUGMENTIN) 875-125 MG tablet Take 1 tablet by mouth 2 times daily (post op infection), Disp-14 tablet, R-0, E-Prescribe      oxyCODONE (ROXICODONE) 5 MG tablet Take 1 tablet (5 mg) by mouth every 4 hours as needed  for moderate to severe pain, Disp-12 tablet, R-0, E-Prescribe         CONTINUE these medications which have NOT CHANGED    Details   Ascorbic Acid (VITAMIN C) 500 MG CHEW Take 1 tablet by mouth daily, Historical      aspirin (ASA) 81 MG EC tablet Take 81 mg by mouth daily , Historical      atorvastatin (LIPITOR) 40 MG tablet [ATORVASTATIN (LIPITOR) 40 MG TABLET] Take 40 mg by mouth at bedtime., Historical      calcium carbonate 500 mg, elemental, (OSCAL 500) 1250 (500 Ca) MG TABS tablet Take 1 tablet by mouth daily, Historical      lisinopriL (PRINIVIL,ZESTRIL) 40 MG tablet [LISINOPRIL (PRINIVIL,ZESTRIL) 40 MG TABLET] Take 40 mg by mouth daily., Historical      Turmeric 500 MG CAPS Take 1 capsule by mouth daily, Historical      Vitamin D, Cholecalciferol, 25 MCG (1000 UT) CAPS Take 1 capsule by mouth daily, Historical                   Rationale for medication changes:    See above        Consults       GASTROENTEROLOGY IP CONSULT  SURGERY GENERAL IP CONSULT  SOCIAL WORK IP CONSULT  SOCIAL WORK IP CONSULT    Immunizations given this encounter     Most Recent Immunizations   Administered Date(s) Administered     COVID-19,PF,Pfizer 03/25/2021           Anticoagulation Information      Recent INR results: No results for input(s): INR in the last 168 hours.  Warfarin doses (if applicable) or name of other anticoagulant: na      SIGNIFICANT IMAGING FINDINGS     Results for orders placed or performed during the hospital encounter of 07/26/21   Abdomen US, limited (RUQ only)    Impression    IMPRESSION:  1.  The gallbladder is contracted and suboptimally assessed. Recommend repeat study after fasting. Probable polyp.  2.  Echogenic liver compatible with fatty infiltration.  3.  Borderline dilated common bile duct.       XR ERCP    Impression    IMPRESSION:  1.  Choledocholithiasis with removal of common duct stone. No complications are identified.       SIGNIFICANT LABORATORY FINDINGS     Culture Positive on the 1st day of  incubationAbnormal        Escherichia coliPanic     2 of 2 bottles      Resulting Agency: [facility]   Susceptibility     Escherichia coli     SARA     Ampicillin 4.0 ug/mL Susceptible     Ampicillin/ Sulbactam 4.0 ug/mL Susceptible     Cefepime <=1.0 ug/mL Susceptible     Ceftazidime <=1.0 ug/mL Susceptible     Ceftriaxone <=1.0 ug/mL Susceptible     Ciprofloxacin 0.5 ug/mL Intermediate     Gentamicin <=1.0 ug/mL Susceptible     Levofloxacin 1.0 ug/mL Intermediate     Meropenem <=0.25 ug/mL Susceptible     Piperacillin/Tazobactam <=4.0 ug/mL Susceptible     Tobramycin <=1.0 ug/mL Susceptible     Trimethoprim/Sulfa <=1/19 ug/mL Susceptible                 Specimen Collected: 07/27/21  1:28 AM           .last  Discharge Orders        Reason for your hospital stay    Cholecystitis with stones blocking duct and cholecystectomy with ERCP (duct stone removal) post op infection responding to antibiotics; drain remains and likley removed next week     Follow-up and recommended labs and tests     Follow up as planned with surgery next week     Activity    Your activity upon discharge: activity as tolerated     Diet    Follow this diet upon discharge: regular diet       Examination   Physical Exam      Wt Readings from Last 1 Encounters:   07/29/21 68.9 kg (152 lb)     Comfortable and healthy. Drain still in place see surgery note  Abdomen benign otherwise      Please see EMR for more detailed significant labs, imaging, consultant notes etc.    IKurt MD, personally saw the patient today and spent greater than 30 minutes discharging this patient.    Kurt Chiang MD  [facility]    CC:No Ref-Primary, Physician

## 2021-08-05 ENCOUNTER — OFFICE VISIT (OUTPATIENT)
Dept: SURGERY | Facility: CLINIC | Age: 81
End: 2021-08-05
Payer: COMMERCIAL

## 2021-08-05 DIAGNOSIS — Z90.49 S/P LAPAROSCOPIC CHOLECYSTECTOMY: Primary | ICD-10-CM

## 2021-08-05 PROCEDURE — 99024 POSTOP FOLLOW-UP VISIT: CPT | Performed by: PHYSICIAN ASSISTANT

## 2021-08-05 ASSESSMENT — PAIN SCALES - GENERAL: PAINLEVEL: MODERATE PAIN (4)

## 2021-08-05 NOTE — LETTER
8/5/2021         RE: Celina Galicia  [address] [address]        Dear Colleague,    Thank you for referring your patient, Celina Galicia, to the [facility] [place]. Please see a copy of my visit note below.    HPI: Pt is here for follow up s/p lap subtotal jennifer with Dr. Cortes on 7/27/21.   she is doing well.  Pain is well controlled:  Yes. No difficulties with the surgical wound/wounds.  she is eating well and denies fever and chills.      Drain has been serous and putting about 10 ml's out daily    LMP  (LMP Unknown)     EXAM:  GENERAL:Appears well  ABDOMEN:  Soft, +BS, drain with scant serous output, this was removed by me  SURGICAL WOUNDS:  Incisions healing well, no induration or drainage. steristrips removed      Assessment/Plan: Doing well after surgery and should follow up as needed.    Fan Houston PA-C  [phone]  General Surgery           Again, thank you for allowing me to participate in the care of your patient.        Sincerely,        Fan Houston PA-C

## 2021-08-07 NOTE — PROGRESS NOTES
HPI: Pt is here for follow up s/p lap subtotal jennifer with Dr. Cortes on 7/27/21.   she is doing well.  Pain is well controlled:  Yes. No difficulties with the surgical wound/wounds.  she is eating well and denies fever and chills.      Drain has been serous and putting about 10 ml's out daily    LMP  (LMP Unknown)     EXAM:  GENERAL:Appears well  ABDOMEN:  Soft, +BS, drain with scant serous output, this was removed by me  SURGICAL WOUNDS:  Incisions healing well, no induration or drainage. steristrips removed      Assessment/Plan: Doing well after surgery and should follow up as needed.    Fan Houston PA-C  [phone]  General Surgery

## 2021-11-17 ENCOUNTER — LAB REQUISITION (OUTPATIENT)
Dept: LAB | Facility: CLINIC | Age: 81
End: 2021-11-17

## 2021-11-17 DIAGNOSIS — I10 ESSENTIAL (PRIMARY) HYPERTENSION: ICD-10-CM

## 2021-11-17 DIAGNOSIS — E78.49 OTHER HYPERLIPIDEMIA: ICD-10-CM

## 2021-11-17 LAB
ALBUMIN SERPL-MCNC: 4 G/DL (ref 3.5–5)
ALP SERPL-CCNC: 64 U/L (ref 45–120)
ALT SERPL W P-5'-P-CCNC: 20 U/L (ref 0–45)
ANION GAP SERPL CALCULATED.3IONS-SCNC: 13 MMOL/L (ref 5–18)
AST SERPL W P-5'-P-CCNC: 24 U/L (ref 0–40)
BILIRUB SERPL-MCNC: 0.7 MG/DL (ref 0–1)
BUN SERPL-MCNC: 10 MG/DL (ref 8–28)
CALCIUM SERPL-MCNC: 9.6 MG/DL (ref 8.5–10.5)
CHLORIDE BLD-SCNC: 107 MMOL/L (ref 98–107)
CHOLEST SERPL-MCNC: 171 MG/DL
CO2 SERPL-SCNC: 22 MMOL/L (ref 22–31)
CREAT SERPL-MCNC: 0.74 MG/DL (ref 0.6–1.1)
GFR SERPL CREATININE-BSD FRML MDRD: 76 ML/MIN/1.73M2
GLUCOSE BLD-MCNC: 105 MG/DL (ref 70–125)
HDLC SERPL-MCNC: 50 MG/DL
LDLC SERPL CALC-MCNC: 78 MG/DL
POTASSIUM BLD-SCNC: 4.7 MMOL/L (ref 3.5–5)
PROT SERPL-MCNC: 6.9 G/DL (ref 6–8)
SODIUM SERPL-SCNC: 142 MMOL/L (ref 136–145)
TRIGL SERPL-MCNC: 216 MG/DL

## 2021-11-17 PROCEDURE — 80053 COMPREHEN METABOLIC PANEL: CPT | Performed by: PHYSICIAN ASSISTANT

## 2021-11-17 PROCEDURE — 80061 LIPID PANEL: CPT | Performed by: PHYSICIAN ASSISTANT

## 2022-02-17 PROBLEM — R78.81 E COLI BACTEREMIA: Chronic | Status: ACTIVE | Noted: 2021-07-30

## 2022-02-17 PROBLEM — B96.20 E COLI BACTEREMIA: Chronic | Status: ACTIVE | Noted: 2021-07-30

## 2022-07-19 ENCOUNTER — LAB REQUISITION (OUTPATIENT)
Dept: LAB | Facility: CLINIC | Age: 82
End: 2022-07-19

## 2022-07-19 DIAGNOSIS — I10 ESSENTIAL (PRIMARY) HYPERTENSION: ICD-10-CM

## 2022-07-19 DIAGNOSIS — R10.32 LEFT LOWER QUADRANT PAIN: ICD-10-CM

## 2022-07-19 LAB
ANION GAP SERPL CALCULATED.3IONS-SCNC: 13 MMOL/L (ref 7–15)
BUN SERPL-MCNC: 9.1 MG/DL (ref 8–23)
CALCIUM SERPL-MCNC: 9.5 MG/DL (ref 8.8–10.2)
CHLORIDE SERPL-SCNC: 104 MMOL/L (ref 98–107)
CREAT SERPL-MCNC: 0.63 MG/DL (ref 0.51–0.95)
DEPRECATED HCO3 PLAS-SCNC: 25 MMOL/L (ref 22–29)
ERYTHROCYTE [DISTWIDTH] IN BLOOD BY AUTOMATED COUNT: 12 % (ref 10–15)
GFR SERPL CREATININE-BSD FRML MDRD: 88 ML/MIN/1.73M2
GLUCOSE SERPL-MCNC: 97 MG/DL (ref 70–99)
HCT VFR BLD AUTO: 41.3 % (ref 35–47)
HGB BLD-MCNC: 13.8 G/DL (ref 11.7–15.7)
MCH RBC QN AUTO: 31.4 PG (ref 26.5–33)
MCHC RBC AUTO-ENTMCNC: 33.4 G/DL (ref 31.5–36.5)
MCV RBC AUTO: 94 FL (ref 78–100)
PLATELET # BLD AUTO: 352 10E3/UL (ref 150–450)
POTASSIUM SERPL-SCNC: 4.8 MMOL/L (ref 3.4–5.3)
RBC # BLD AUTO: 4.39 10E6/UL (ref 3.8–5.2)
SODIUM SERPL-SCNC: 142 MMOL/L (ref 136–145)
WBC # BLD AUTO: 6.3 10E3/UL (ref 4–11)

## 2022-07-19 PROCEDURE — 80048 BASIC METABOLIC PNL TOTAL CA: CPT | Performed by: PHYSICIAN ASSISTANT

## 2022-07-19 PROCEDURE — 85014 HEMATOCRIT: CPT | Performed by: PHYSICIAN ASSISTANT

## 2023-01-01 NOTE — ANESTHESIA PROCEDURE NOTES
Airway       Patient location during procedure: OR       Procedure Start/Stop Times: 7/27/2021 12:51 PM  Staff -        Performed By: CRNA  Consent for Airway        Urgency: elective  Indications and Patient Condition       Indications for airway management: celine-procedural         Mask difficulty assessment: 1 - vent by mask    Final Airway Details       Final airway type: endotracheal airway       Successful airway: ETT - single  Endotracheal Airway Details        ETT size (mm): 7.0       Cuffed: yes       Cuff volume (mL): 7       Successful intubation technique: video laryngoscopy       VL Blade Size: Glidescope 3       Grade View of Cords: 1       Adjucts: stylet       Position: Center       Measured from: lips       Bite block used: None    Post intubation assessment        Placement verified by: capnometry, equal breath sounds and chest rise        Number of attempts at approach: 1       Secured with: silk tape       Ease of procedure: easy       Dentition: Intact           Meri Salazar  (NP)  2023 16:25:47 Meri Salazar  (NP)  2023 16:28:42

## 2023-06-22 ENCOUNTER — LAB REQUISITION (OUTPATIENT)
Dept: LAB | Facility: CLINIC | Age: 83
End: 2023-06-22

## 2023-06-22 DIAGNOSIS — E78.49 OTHER HYPERLIPIDEMIA: ICD-10-CM

## 2023-06-22 DIAGNOSIS — I10 ESSENTIAL (PRIMARY) HYPERTENSION: ICD-10-CM

## 2023-06-22 DIAGNOSIS — R10.9 UNSPECIFIED ABDOMINAL PAIN: ICD-10-CM

## 2023-06-22 LAB
ALBUMIN SERPL BCG-MCNC: 4.5 G/DL (ref 3.5–5.2)
ALP SERPL-CCNC: 79 U/L (ref 35–104)
ALT SERPL W P-5'-P-CCNC: 22 U/L (ref 0–50)
ANION GAP SERPL CALCULATED.3IONS-SCNC: 14 MMOL/L (ref 7–15)
AST SERPL W P-5'-P-CCNC: 25 U/L (ref 0–45)
BASOPHILS # BLD AUTO: 0 10E3/UL (ref 0–0.2)
BASOPHILS NFR BLD AUTO: 0 %
BILIRUB SERPL-MCNC: 0.6 MG/DL
BUN SERPL-MCNC: 10.4 MG/DL (ref 8–23)
CALCIUM SERPL-MCNC: 9.5 MG/DL (ref 8.8–10.2)
CHLORIDE SERPL-SCNC: 106 MMOL/L (ref 98–107)
CHOLEST SERPL-MCNC: 173 MG/DL
CREAT SERPL-MCNC: 0.72 MG/DL (ref 0.51–0.95)
DEPRECATED HCO3 PLAS-SCNC: 23 MMOL/L (ref 22–29)
EOSINOPHIL # BLD AUTO: 0.1 10E3/UL (ref 0–0.7)
EOSINOPHIL NFR BLD AUTO: 1 %
ERYTHROCYTE [DISTWIDTH] IN BLOOD BY AUTOMATED COUNT: 12.3 % (ref 10–15)
GFR SERPL CREATININE-BSD FRML MDRD: 83 ML/MIN/1.73M2
GLUCOSE SERPL-MCNC: 114 MG/DL (ref 70–99)
HCT VFR BLD AUTO: 42.8 % (ref 35–47)
HDLC SERPL-MCNC: 50 MG/DL
HGB BLD-MCNC: 14 G/DL (ref 11.7–15.7)
IMM GRANULOCYTES # BLD: 0 10E3/UL
IMM GRANULOCYTES NFR BLD: 0 %
LDLC SERPL CALC-MCNC: 68 MG/DL
LYMPHOCYTES # BLD AUTO: 2.2 10E3/UL (ref 0.8–5.3)
LYMPHOCYTES NFR BLD AUTO: 30 %
MCH RBC QN AUTO: 31.9 PG (ref 26.5–33)
MCHC RBC AUTO-ENTMCNC: 32.7 G/DL (ref 31.5–36.5)
MCV RBC AUTO: 98 FL (ref 78–100)
MONOCYTES # BLD AUTO: 0.5 10E3/UL (ref 0–1.3)
MONOCYTES NFR BLD AUTO: 7 %
NEUTROPHILS # BLD AUTO: 4.5 10E3/UL (ref 1.6–8.3)
NEUTROPHILS NFR BLD AUTO: 62 %
NONHDLC SERPL-MCNC: 123 MG/DL
NRBC # BLD AUTO: 0 10E3/UL
NRBC BLD AUTO-RTO: 0 /100
PLATELET # BLD AUTO: 290 10E3/UL (ref 150–450)
POTASSIUM SERPL-SCNC: 4.6 MMOL/L (ref 3.4–5.3)
PROT SERPL-MCNC: 6.9 G/DL (ref 6.4–8.3)
RBC # BLD AUTO: 4.39 10E6/UL (ref 3.8–5.2)
SODIUM SERPL-SCNC: 143 MMOL/L (ref 136–145)
TRIGL SERPL-MCNC: 274 MG/DL
WBC # BLD AUTO: 7.3 10E3/UL (ref 4–11)

## 2023-06-22 PROCEDURE — 85025 COMPLETE CBC W/AUTO DIFF WBC: CPT | Performed by: PHYSICIAN ASSISTANT

## 2023-06-22 PROCEDURE — 80061 LIPID PANEL: CPT | Performed by: PHYSICIAN ASSISTANT

## 2023-06-22 PROCEDURE — 80053 COMPREHEN METABOLIC PANEL: CPT | Performed by: PHYSICIAN ASSISTANT

## 2024-10-17 ENCOUNTER — LAB REQUISITION (OUTPATIENT)
Dept: LAB | Facility: CLINIC | Age: 84
End: 2024-10-17
Payer: COMMERCIAL

## 2024-10-17 DIAGNOSIS — M06.9 RHEUMATOID ARTHRITIS, UNSPECIFIED (H): ICD-10-CM

## 2024-10-17 DIAGNOSIS — E78.49 OTHER HYPERLIPIDEMIA: ICD-10-CM

## 2024-10-17 DIAGNOSIS — I10 ESSENTIAL (PRIMARY) HYPERTENSION: ICD-10-CM

## 2024-10-17 LAB
ALBUMIN SERPL BCG-MCNC: 4.4 G/DL (ref 3.5–5.2)
ALP SERPL-CCNC: 67 U/L (ref 40–150)
ALT SERPL W P-5'-P-CCNC: 22 U/L (ref 0–50)
ANION GAP SERPL CALCULATED.3IONS-SCNC: 13 MMOL/L (ref 7–15)
AST SERPL W P-5'-P-CCNC: 26 U/L (ref 0–45)
BILIRUB SERPL-MCNC: 0.4 MG/DL
BUN SERPL-MCNC: 13 MG/DL (ref 8–23)
CALCIUM SERPL-MCNC: 9.4 MG/DL (ref 8.8–10.4)
CHLORIDE SERPL-SCNC: 107 MMOL/L (ref 98–107)
CHOLEST SERPL-MCNC: 170 MG/DL
CREAT SERPL-MCNC: 0.7 MG/DL (ref 0.51–0.95)
EGFRCR SERPLBLD CKD-EPI 2021: 85 ML/MIN/1.73M2
ERYTHROCYTE [DISTWIDTH] IN BLOOD BY AUTOMATED COUNT: 13.2 % (ref 10–15)
FASTING STATUS PATIENT QL REPORTED: ABNORMAL
FASTING STATUS PATIENT QL REPORTED: ABNORMAL
GLUCOSE SERPL-MCNC: 112 MG/DL (ref 70–99)
HCO3 SERPL-SCNC: 23 MMOL/L (ref 22–29)
HCT VFR BLD AUTO: 42.5 % (ref 35–47)
HDLC SERPL-MCNC: 48 MG/DL
HGB BLD-MCNC: 14 G/DL (ref 11.7–15.7)
LDLC SERPL CALC-MCNC: 72 MG/DL
MCH RBC QN AUTO: 33 PG (ref 26.5–33)
MCHC RBC AUTO-ENTMCNC: 32.9 G/DL (ref 31.5–36.5)
MCV RBC AUTO: 100 FL (ref 78–100)
NONHDLC SERPL-MCNC: 122 MG/DL
PLATELET # BLD AUTO: 292 10E3/UL (ref 150–450)
POTASSIUM SERPL-SCNC: 4.3 MMOL/L (ref 3.4–5.3)
PROT SERPL-MCNC: 7.5 G/DL (ref 6.4–8.3)
RBC # BLD AUTO: 4.24 10E6/UL (ref 3.8–5.2)
SODIUM SERPL-SCNC: 143 MMOL/L (ref 135–145)
TRIGL SERPL-MCNC: 251 MG/DL
WBC # BLD AUTO: 7.4 10E3/UL (ref 4–11)

## 2024-10-17 PROCEDURE — 85027 COMPLETE CBC AUTOMATED: CPT | Mod: ORL | Performed by: PHYSICIAN ASSISTANT

## 2024-10-17 PROCEDURE — 80061 LIPID PANEL: CPT | Mod: ORL | Performed by: PHYSICIAN ASSISTANT

## 2024-10-17 PROCEDURE — 80053 COMPREHEN METABOLIC PANEL: CPT | Mod: ORL | Performed by: PHYSICIAN ASSISTANT

## 2025-01-14 ENCOUNTER — LAB REQUISITION (OUTPATIENT)
Dept: LAB | Facility: CLINIC | Age: 85
End: 2025-01-14
Payer: COMMERCIAL

## 2025-01-14 DIAGNOSIS — I10 ESSENTIAL (PRIMARY) HYPERTENSION: ICD-10-CM

## 2025-01-14 PROCEDURE — 80048 BASIC METABOLIC PNL TOTAL CA: CPT | Performed by: PHYSICIAN ASSISTANT

## 2025-01-15 LAB
ANION GAP SERPL CALCULATED.3IONS-SCNC: 13 MMOL/L (ref 7–15)
BUN SERPL-MCNC: 12.2 MG/DL (ref 8–23)
CALCIUM SERPL-MCNC: 9.3 MG/DL (ref 8.8–10.4)
CHLORIDE SERPL-SCNC: 103 MMOL/L (ref 98–107)
CREAT SERPL-MCNC: 0.77 MG/DL (ref 0.51–0.95)
EGFRCR SERPLBLD CKD-EPI 2021: 76 ML/MIN/1.73M2
GLUCOSE SERPL-MCNC: 97 MG/DL (ref 70–99)
HCO3 SERPL-SCNC: 25 MMOL/L (ref 22–29)
POTASSIUM SERPL-SCNC: 4.5 MMOL/L (ref 3.4–5.3)
SODIUM SERPL-SCNC: 141 MMOL/L (ref 135–145)

## (undated) DEVICE — PLATE GROUNDING ADULT W/CORD 9165L

## (undated) DEVICE — BLADE KNIFE SURG 11 371111

## (undated) DEVICE — SUCTION MANIFOLD NEPTUNE 2 SYS 1 PORT 702-025-000

## (undated) DEVICE — TUBING SUCTION MEDI-VAC 1/4"X20' N620A - HE

## (undated) DEVICE — CUSTOM PACK LAP CHOLE SBA5BLCHEA

## (undated) DEVICE — DECANTER VIAL 2006S

## (undated) DEVICE — ENDO TROCAR FIRST ENTRY KII FIOS Z-THRD 05X100MM CTF03

## (undated) DEVICE — DRAIN BLAKE 15FR SIL 2229

## (undated) DEVICE — WIRE GUIDE 0.35X270CM STRAIGHT TIP VISIGLIDE G-260-3527S

## (undated) DEVICE — ENDO SHEARS RENEW LAP ENDOCUT SCISSOR TIP 16.5MM 3142

## (undated) DEVICE — Device

## (undated) DEVICE — NDL INSUFFLATION 13GA 120MM C2201

## (undated) DEVICE — ENDO FUSION OMNI-TOME G31903

## (undated) DEVICE — SOL WATER IRRIG 1000ML BOTTLE 2F7114

## (undated) DEVICE — BALLOON EXTRACTION 3-LUMEN 15X190MM 2.8MM TL B-V233P-A

## (undated) DEVICE — PREP CHLORAPREP 26ML TINTED HI-LITE ORANGE 930815

## (undated) DEVICE — BANDAGE ADH LF 1X3 ABN3100A

## (undated) DEVICE — DRAIN RESERVOIR 100ML JP 0070740

## (undated) DEVICE — SU MONOCRYL+ 4-0 18IN PS2 UND MCP496G

## (undated) DEVICE — TUBING SMOKE EVAC PLUME PORT PP010CS

## (undated) DEVICE — DRSG STERI STRIP 1/2X4" R1547

## (undated) DEVICE — GLOVE BIOGEL PI ORTHOPRO SZ 7.5 47675

## (undated) DEVICE — SOL NACL 0.9% IRRIG 1000ML BOTTLE 2F7124

## (undated) DEVICE — CLIP LIGACLIP LG YELLOW LT400

## (undated) DEVICE — ENDO TROCAR FIRST ENTRY KII FIOS Z-THRD 11X100MM CTF33

## (undated) DEVICE — SU SILK 2-0 FS-1 18" 685G

## (undated) DEVICE — SU VICRYL+ 0 27 UR6 VLT VCP603H

## (undated) DEVICE — WIRE GUIDE 0.035"X260CM NAVIPRO ANG 5625 M00556251

## (undated) DEVICE — ENDO TROCAR SLEEVE KII Z-THREADED 05X100MM CTS02

## (undated) DEVICE — GLOVE SURG PI ULTRA TOUCH M SZ 8-1/2 LF

## (undated) RX ORDER — BUPIVACAINE HYDROCHLORIDE 2.5 MG/ML
INJECTION, SOLUTION INFILTRATION; PERINEURAL
Status: DISPENSED
Start: 2021-07-27